# Patient Record
Sex: MALE | Race: WHITE | NOT HISPANIC OR LATINO | Employment: OTHER | URBAN - METROPOLITAN AREA
[De-identification: names, ages, dates, MRNs, and addresses within clinical notes are randomized per-mention and may not be internally consistent; named-entity substitution may affect disease eponyms.]

---

## 2018-04-26 ENCOUNTER — APPOINTMENT (OUTPATIENT)
Dept: PREADMISSION TESTING | Facility: HOSPITAL | Age: 73
End: 2018-04-26

## 2018-04-26 VITALS — HEIGHT: 68 IN | WEIGHT: 177.25 LBS | BODY MASS INDEX: 26.86 KG/M2

## 2018-04-26 LAB
DEPRECATED RDW RBC AUTO: 46.1 FL (ref 37–54)
ERYTHROCYTE [DISTWIDTH] IN BLOOD BY AUTOMATED COUNT: 13.2 % (ref 11.3–14.5)
HCT VFR BLD AUTO: 48 % (ref 38.9–50.9)
HGB BLD-MCNC: 16.2 G/DL (ref 13.1–17.5)
MCH RBC QN AUTO: 32.6 PG (ref 27–31)
MCHC RBC AUTO-ENTMCNC: 33.8 G/DL (ref 32–36)
MCV RBC AUTO: 96.6 FL (ref 80–99)
PLATELET # BLD AUTO: 421 10*3/MM3 (ref 150–450)
PMV BLD AUTO: 9.2 FL (ref 6–12)
POTASSIUM BLD-SCNC: 5.2 MMOL/L (ref 3.5–5.5)
RBC # BLD AUTO: 4.97 10*6/MM3 (ref 4.2–5.76)
WBC NRBC COR # BLD: 19.64 10*3/MM3 (ref 3.5–10.8)

## 2018-04-26 PROCEDURE — 93005 ELECTROCARDIOGRAM TRACING: CPT

## 2018-04-26 PROCEDURE — 36415 COLL VENOUS BLD VENIPUNCTURE: CPT

## 2018-04-26 PROCEDURE — 85027 COMPLETE CBC AUTOMATED: CPT | Performed by: ORTHOPAEDIC SURGERY

## 2018-04-26 PROCEDURE — 93010 ELECTROCARDIOGRAM REPORT: CPT | Performed by: INTERNAL MEDICINE

## 2018-04-26 PROCEDURE — 84132 ASSAY OF SERUM POTASSIUM: CPT | Performed by: ORTHOPAEDIC SURGERY

## 2018-04-26 RX ORDER — OMEPRAZOLE 20 MG/1
20 CAPSULE, DELAYED RELEASE ORAL DAILY
COMMUNITY

## 2018-04-26 RX ORDER — HYDROCODONE BITARTRATE AND ACETAMINOPHEN 7.5; 325 MG/1; MG/1
1 TABLET ORAL EVERY 6 HOURS PRN
COMMUNITY
End: 2020-11-03

## 2018-04-26 RX ORDER — LISINOPRIL 20 MG/1
20 TABLET ORAL DAILY
COMMUNITY

## 2018-04-26 NOTE — DISCHARGE INSTRUCTIONS

## 2018-04-26 NOTE — PAT
Patient to apply Chlorhexadine wipes  to surgical area (as instructed) the night before procedure and the AM of procedure. Wipes provided.    Patient instructed to drink 20 ounces (or until full) of Gatorade or 20 ounces of G2 (if diabetic) and complete 3 hours before your surgery start time. (NO RED Gatorade or G2)    Patient verbalized understanding.    EKG done and on chart.  Cardiac clearance per Dr. Cano.

## 2018-04-27 ENCOUNTER — ANESTHESIA (OUTPATIENT)
Dept: PERIOP | Facility: HOSPITAL | Age: 73
End: 2018-04-27

## 2018-04-27 ENCOUNTER — ANESTHESIA EVENT (OUTPATIENT)
Dept: PERIOP | Facility: HOSPITAL | Age: 73
End: 2018-04-27

## 2018-04-27 ENCOUNTER — HOSPITAL ENCOUNTER (OUTPATIENT)
Facility: HOSPITAL | Age: 73
Setting detail: HOSPITAL OUTPATIENT SURGERY
Discharge: HOME | End: 2018-04-27
Attending: ORTHOPAEDIC SURGERY | Admitting: ORTHOPAEDIC SURGERY

## 2018-04-27 ENCOUNTER — APPOINTMENT (OUTPATIENT)
Dept: GENERAL RADIOLOGY | Facility: HOSPITAL | Age: 73
End: 2018-04-27

## 2018-04-27 VITALS
SYSTOLIC BLOOD PRESSURE: 158 MMHG | OXYGEN SATURATION: 94 % | HEIGHT: 68 IN | RESPIRATION RATE: 18 BRPM | HEART RATE: 91 BPM | TEMPERATURE: 98.7 F | DIASTOLIC BLOOD PRESSURE: 75 MMHG | WEIGHT: 177 LBS | BODY MASS INDEX: 26.83 KG/M2

## 2018-04-27 PROCEDURE — 25010000002 FENTANYL CITRATE (PF) 100 MCG/2ML SOLUTION: Performed by: NURSE ANESTHETIST, CERTIFIED REGISTERED

## 2018-04-27 PROCEDURE — 25010000002 ONDANSETRON PER 1 MG: Performed by: NURSE ANESTHETIST, CERTIFIED REGISTERED

## 2018-04-27 PROCEDURE — 25010000002 DEXAMETHASONE PER 1 MG: Performed by: NURSE ANESTHETIST, CERTIFIED REGISTERED

## 2018-04-27 PROCEDURE — 72020 X-RAY EXAM OF SPINE 1 VIEW: CPT

## 2018-04-27 PROCEDURE — 25010000002 NEOSTIGMINE 10 MG/10ML SOLUTION: Performed by: NURSE ANESTHETIST, CERTIFIED REGISTERED

## 2018-04-27 PROCEDURE — 25010000002 PHENYLEPHRINE PER 1 ML: Performed by: NURSE ANESTHETIST, CERTIFIED REGISTERED

## 2018-04-27 PROCEDURE — 25010000002 VANCOMYCIN: Performed by: ORTHOPAEDIC SURGERY

## 2018-04-27 PROCEDURE — 25010000002 PROPOFOL 10 MG/ML EMULSION: Performed by: NURSE ANESTHETIST, CERTIFIED REGISTERED

## 2018-04-27 RX ORDER — LIDOCAINE HYDROCHLORIDE 10 MG/ML
INJECTION, SOLUTION EPIDURAL; INFILTRATION; INTRACAUDAL; PERINEURAL AS NEEDED
Status: DISCONTINUED | OUTPATIENT
Start: 2018-04-27 | End: 2018-04-27 | Stop reason: SURG

## 2018-04-27 RX ORDER — LIDOCAINE HYDROCHLORIDE 10 MG/ML
0.5 INJECTION, SOLUTION EPIDURAL; INFILTRATION; INTRACAUDAL; PERINEURAL ONCE AS NEEDED
Status: COMPLETED | OUTPATIENT
Start: 2018-04-27 | End: 2018-04-27

## 2018-04-27 RX ORDER — ONDANSETRON 2 MG/ML
INJECTION INTRAMUSCULAR; INTRAVENOUS AS NEEDED
Status: DISCONTINUED | OUTPATIENT
Start: 2018-04-27 | End: 2018-04-27 | Stop reason: SURG

## 2018-04-27 RX ORDER — NEOSTIGMINE METHYLSULFATE 1 MG/ML
INJECTION, SOLUTION INTRAVENOUS AS NEEDED
Status: DISCONTINUED | OUTPATIENT
Start: 2018-04-27 | End: 2018-04-27 | Stop reason: SURG

## 2018-04-27 RX ORDER — PREDNISONE 10 MG/1
10 TABLET ORAL DAILY
COMMUNITY
End: 2018-04-27 | Stop reason: HOSPADM

## 2018-04-27 RX ORDER — FENTANYL CITRATE 50 UG/ML
INJECTION, SOLUTION INTRAMUSCULAR; INTRAVENOUS AS NEEDED
Status: DISCONTINUED | OUTPATIENT
Start: 2018-04-27 | End: 2018-04-27 | Stop reason: SURG

## 2018-04-27 RX ORDER — PREGABALIN 75 MG/1
75 CAPSULE ORAL ONCE
Status: COMPLETED | OUTPATIENT
Start: 2018-04-27 | End: 2018-04-27

## 2018-04-27 RX ORDER — ACETAMINOPHEN 500 MG
1000 TABLET ORAL ONCE
Status: COMPLETED | OUTPATIENT
Start: 2018-04-27 | End: 2018-04-27

## 2018-04-27 RX ORDER — PROPOFOL 10 MG/ML
VIAL (ML) INTRAVENOUS CONTINUOUS PRN
Status: DISCONTINUED | OUTPATIENT
Start: 2018-04-27 | End: 2018-04-27 | Stop reason: SURG

## 2018-04-27 RX ORDER — OXYCODONE HCL 10 MG/1
10 TABLET, FILM COATED, EXTENDED RELEASE ORAL ONCE
Status: COMPLETED | OUTPATIENT
Start: 2018-04-27 | End: 2018-04-27

## 2018-04-27 RX ORDER — FAMOTIDINE 20 MG/1
20 TABLET, FILM COATED ORAL ONCE
Status: COMPLETED | OUTPATIENT
Start: 2018-04-27 | End: 2018-04-27

## 2018-04-27 RX ORDER — ONDANSETRON 4 MG/1
4 TABLET, FILM COATED ORAL ONCE AS NEEDED
Status: DISCONTINUED | OUTPATIENT
Start: 2018-04-27 | End: 2018-04-27 | Stop reason: HOSPADM

## 2018-04-27 RX ORDER — MAGNESIUM HYDROXIDE 1200 MG/15ML
LIQUID ORAL AS NEEDED
Status: DISCONTINUED | OUTPATIENT
Start: 2018-04-27 | End: 2018-04-27 | Stop reason: HOSPADM

## 2018-04-27 RX ORDER — EPHEDRINE SULFATE 50 MG/ML
5 INJECTION, SOLUTION INTRAVENOUS ONCE AS NEEDED
Status: DISCONTINUED | OUTPATIENT
Start: 2018-04-27 | End: 2018-04-27 | Stop reason: HOSPADM

## 2018-04-27 RX ORDER — DEXAMETHASONE SODIUM PHOSPHATE 4 MG/ML
INJECTION, SOLUTION INTRA-ARTICULAR; INTRALESIONAL; INTRAMUSCULAR; INTRAVENOUS; SOFT TISSUE AS NEEDED
Status: DISCONTINUED | OUTPATIENT
Start: 2018-04-27 | End: 2018-04-27 | Stop reason: SURG

## 2018-04-27 RX ORDER — BUPIVACAINE HYDROCHLORIDE AND EPINEPHRINE 2.5; 5 MG/ML; UG/ML
INJECTION, SOLUTION EPIDURAL; INFILTRATION; INTRACAUDAL; PERINEURAL AS NEEDED
Status: DISCONTINUED | OUTPATIENT
Start: 2018-04-27 | End: 2018-04-27 | Stop reason: HOSPADM

## 2018-04-27 RX ORDER — FENTANYL CITRATE 50 UG/ML
25 INJECTION, SOLUTION INTRAMUSCULAR; INTRAVENOUS
Status: DISCONTINUED | OUTPATIENT
Start: 2018-04-27 | End: 2018-04-27 | Stop reason: HOSPADM

## 2018-04-27 RX ORDER — CELECOXIB 200 MG/1
200 CAPSULE ORAL ONCE
Status: COMPLETED | OUTPATIENT
Start: 2018-04-27 | End: 2018-04-27

## 2018-04-27 RX ORDER — GLYCOPYRROLATE 0.2 MG/ML
INJECTION INTRAMUSCULAR; INTRAVENOUS AS NEEDED
Status: DISCONTINUED | OUTPATIENT
Start: 2018-04-27 | End: 2018-04-27 | Stop reason: SURG

## 2018-04-27 RX ORDER — SODIUM CHLORIDE 0.9 % (FLUSH) 0.9 %
1-10 SYRINGE (ML) INJECTION AS NEEDED
Status: CANCELLED | OUTPATIENT
Start: 2018-04-27

## 2018-04-27 RX ORDER — ONDANSETRON 2 MG/ML
4 INJECTION INTRAMUSCULAR; INTRAVENOUS ONCE AS NEEDED
Status: DISCONTINUED | OUTPATIENT
Start: 2018-04-27 | End: 2018-04-27 | Stop reason: HOSPADM

## 2018-04-27 RX ORDER — HYDROCODONE BITARTRATE AND ACETAMINOPHEN 7.5; 325 MG/1; MG/1
1 TABLET ORAL ONCE AS NEEDED
Status: DISCONTINUED | OUTPATIENT
Start: 2018-04-27 | End: 2018-04-27 | Stop reason: HOSPADM

## 2018-04-27 RX ORDER — SODIUM CHLORIDE, SODIUM LACTATE, POTASSIUM CHLORIDE, CALCIUM CHLORIDE 600; 310; 30; 20 MG/100ML; MG/100ML; MG/100ML; MG/100ML
9 INJECTION, SOLUTION INTRAVENOUS CONTINUOUS
Status: DISCONTINUED | OUTPATIENT
Start: 2018-04-27 | End: 2018-04-27 | Stop reason: HOSPADM

## 2018-04-27 RX ORDER — ATRACURIUM BESYLATE 10 MG/ML
INJECTION, SOLUTION INTRAVENOUS AS NEEDED
Status: DISCONTINUED | OUTPATIENT
Start: 2018-04-27 | End: 2018-04-27 | Stop reason: SURG

## 2018-04-27 RX ORDER — PROPOFOL 10 MG/ML
VIAL (ML) INTRAVENOUS AS NEEDED
Status: DISCONTINUED | OUTPATIENT
Start: 2018-04-27 | End: 2018-04-27 | Stop reason: SURG

## 2018-04-27 RX ADMIN — LIDOCAINE HYDROCHLORIDE 50 MG: 10 INJECTION, SOLUTION EPIDURAL; INFILTRATION; INTRACAUDAL; PERINEURAL at 12:36

## 2018-04-27 RX ADMIN — PHENYLEPHRINE HYDROCHLORIDE 80 MCG: 10 INJECTION INTRAVENOUS at 13:35

## 2018-04-27 RX ADMIN — FENTANYL CITRATE 25 MCG: 50 INJECTION INTRAMUSCULAR; INTRAVENOUS at 14:25

## 2018-04-27 RX ADMIN — ATRACURIUM BESYLATE 50 MG: 10 INJECTION, SOLUTION INTRAVENOUS at 12:36

## 2018-04-27 RX ADMIN — FENTANYL CITRATE 25 MCG: 50 INJECTION INTRAMUSCULAR; INTRAVENOUS at 15:07

## 2018-04-27 RX ADMIN — DEXAMETHASONE SODIUM PHOSPHATE 8 MG: 4 INJECTION, SOLUTION INTRAMUSCULAR; INTRAVENOUS at 12:52

## 2018-04-27 RX ADMIN — ONDANSETRON 4 MG: 2 INJECTION INTRAMUSCULAR; INTRAVENOUS at 13:39

## 2018-04-27 RX ADMIN — SODIUM CHLORIDE, POTASSIUM CHLORIDE, SODIUM LACTATE AND CALCIUM CHLORIDE 9 ML/HR: 600; 310; 30; 20 INJECTION, SOLUTION INTRAVENOUS at 08:57

## 2018-04-27 RX ADMIN — CELECOXIB 200 MG: 200 CAPSULE ORAL at 09:28

## 2018-04-27 RX ADMIN — VANCOMYCIN HYDROCHLORIDE 1250 MG: 1 INJECTION, POWDER, LYOPHILIZED, FOR SOLUTION INTRAVENOUS at 11:31

## 2018-04-27 RX ADMIN — FENTANYL CITRATE 100 MCG: 50 INJECTION, SOLUTION INTRAMUSCULAR; INTRAVENOUS at 12:36

## 2018-04-27 RX ADMIN — LIDOCAINE HYDROCHLORIDE 0.2 ML: 10 INJECTION, SOLUTION EPIDURAL; INFILTRATION; INTRACAUDAL; PERINEURAL at 08:57

## 2018-04-27 RX ADMIN — MUPIROCIN: 20 OINTMENT TOPICAL at 08:55

## 2018-04-27 RX ADMIN — PROPOFOL 150 MG: 10 INJECTION, EMULSION INTRAVENOUS at 12:36

## 2018-04-27 RX ADMIN — GLYCOPYRROLATE 0.4 MG: 0.2 INJECTION, SOLUTION INTRAMUSCULAR; INTRAVENOUS at 13:40

## 2018-04-27 RX ADMIN — PHENYLEPHRINE HYDROCHLORIDE 80 MCG: 10 INJECTION INTRAVENOUS at 13:37

## 2018-04-27 RX ADMIN — NEOSTIGMINE METHYLSULFATE 3 MG: 1 INJECTION, SOLUTION INTRAVENOUS at 13:40

## 2018-04-27 RX ADMIN — FAMOTIDINE 20 MG: 20 TABLET ORAL at 08:57

## 2018-04-27 RX ADMIN — PROPOFOL 25 MCG/KG/MIN: 10 INJECTION, EMULSION INTRAVENOUS at 12:48

## 2018-04-27 RX ADMIN — ACETAMINOPHEN 1000 MG: 500 TABLET, FILM COATED ORAL at 08:57

## 2018-04-27 RX ADMIN — OXYCODONE HYDROCHLORIDE 10 MG: 10 TABLET, FILM COATED, EXTENDED RELEASE ORAL at 08:57

## 2018-04-27 RX ADMIN — PREGABALIN 75 MG: 75 CAPSULE ORAL at 08:57

## 2018-04-27 NOTE — ANESTHESIA PREPROCEDURE EVALUATION
Anesthesia Evaluation     NPO Solid Status: > 8 hours  NPO Liquid Status: > 6 hours           Airway   Mallampati: II  TM distance: >3 FB  Neck ROM: full  No difficulty expected  Dental    (+) upper dentures    Pulmonary     breath sounds clear to auscultation  (-) asthma, not a smoker  Cardiovascular     ECG reviewed  Rhythm: regular  Rate: normal    (+) hypertension,   (-) pacemaker, angina, murmur, cardiac stents      Neuro/Psych  (-) seizures, TIA, CVA  GI/Hepatic/Renal/Endo    (-) no renal disease, diabetes, hypothyroidism    Musculoskeletal     Abdominal    Substance History      OB/GYN          Other        ROS/Med Hx Other: EKG's reviewed, Denies CP, SOB or cardiac symptoms                Anesthesia Plan    ASA 2     general     intravenous induction     Plan discussed with CRNA.

## 2018-04-27 NOTE — ANESTHESIA POSTPROCEDURE EVALUATION
Patient: Adiel Lopez    Procedure Summary     Date:  04/27/18 Room / Location:   [facility] [address] [place] [address] [facility]    Anesthesia Start:  1232 Anesthesia Stop:      Procedure:  LUMBAR DISCECTOMY REVISION L4-5 (N/A Spine Lumbar) Diagnosis:      Surgeon:  Wojciech Kathleen MD Provider:  Ed Flores MD    Anesthesia Type:  general ASA Status:  2          Anesthesia Type: general  Last vitals  BP   130/72   Temp   98.7   Pulse   99   Resp   12     SpO2   99%     Post Anesthesia Care and Evaluation    Patient location during evaluation: PACU  Patient participation: complete - patient participated  Level of consciousness: awake  Pain score: 0  Pain management: adequate  Airway patency: patent  Anesthetic complications: No anesthetic complications  PONV Status: none  Cardiovascular status: acceptable and stable  Respiratory status: nasal cannula, unassisted, acceptable and spontaneous ventilation  Hydration status: acceptable

## 2018-04-27 NOTE — ANESTHESIA PROCEDURE NOTES
Airway  Urgency: elective    Airway not difficult    General Information and Staff    Patient location during procedure: OR    Indications and Patient Condition  Indications for airway management: airway protection    Preoxygenated: yes  MILS not maintained throughout  Mask difficulty assessment: 1 - vent by mask    Final Airway Details  Final airway type: endotracheal airway      Successful airway: ETT  Cuffed: yes   Successful intubation technique: direct laryngoscopy  Endotracheal tube insertion site: oral  Blade: Matt  Blade size: #2  ETT size: 7.5 mm  Cormack-Lehane Classification: grade I - full view of glottis  Placement verified by: chest auscultation and capnometry   Cuff volume (mL): 10  Measured from: lips  ETT to lips (cm): 23  Number of attempts at approach: 1    Additional Comments  Negative epigastric sounds, Breath sound equal bilaterally with symmetric chest rise and fall

## 2020-11-03 ENCOUNTER — APPOINTMENT (OUTPATIENT)
Dept: PREADMISSION TESTING | Facility: HOSPITAL | Age: 75
End: 2020-11-03

## 2020-11-03 VITALS — BODY MASS INDEX: 27.13 KG/M2 | WEIGHT: 179.01 LBS | HEIGHT: 68 IN

## 2020-11-03 LAB
DEPRECATED RDW RBC AUTO: 47 FL (ref 37–54)
ERYTHROCYTE [DISTWIDTH] IN BLOOD BY AUTOMATED COUNT: 13.2 % (ref 12.3–15.4)
HBA1C MFR BLD: 5.7 % (ref 4.8–5.6)
HCT VFR BLD AUTO: 44.8 % (ref 37.5–51)
HGB BLD-MCNC: 14.8 G/DL (ref 13–17.7)
MCH RBC QN AUTO: 31.9 PG (ref 26.6–33)
MCHC RBC AUTO-ENTMCNC: 33 G/DL (ref 31.5–35.7)
MCV RBC AUTO: 96.6 FL (ref 79–97)
PLATELET # BLD AUTO: 358 10*3/MM3 (ref 140–450)
PMV BLD AUTO: 9 FL (ref 6–12)
POTASSIUM SERPL-SCNC: 4.5 MMOL/L (ref 3.5–5.2)
RBC # BLD AUTO: 4.64 10*6/MM3 (ref 4.14–5.8)
WBC # BLD AUTO: 11.28 10*3/MM3 (ref 3.4–10.8)

## 2020-11-03 PROCEDURE — C9803 HOPD COVID-19 SPEC COLLECT: HCPCS

## 2020-11-03 PROCEDURE — 36415 COLL VENOUS BLD VENIPUNCTURE: CPT

## 2020-11-03 PROCEDURE — 84132 ASSAY OF SERUM POTASSIUM: CPT | Performed by: ORTHOPAEDIC SURGERY

## 2020-11-03 PROCEDURE — 85027 COMPLETE CBC AUTOMATED: CPT | Performed by: ORTHOPAEDIC SURGERY

## 2020-11-03 PROCEDURE — U0004 COV-19 TEST NON-CDC HGH THRU: HCPCS

## 2020-11-03 PROCEDURE — 83036 HEMOGLOBIN GLYCOSYLATED A1C: CPT | Performed by: ORTHOPAEDIC SURGERY

## 2020-11-03 RX ORDER — TRAZODONE HYDROCHLORIDE 50 MG/1
50 TABLET ORAL NIGHTLY
COMMUNITY

## 2020-11-03 RX ORDER — AMLODIPINE BESYLATE 5 MG/1
5 TABLET ORAL DAILY
COMMUNITY

## 2020-11-03 NOTE — PAT
Patient to apply Chlorhexadine wipes  to surgical area (as instructed) the night before procedure and the AM of procedure. Wipes provided.    Per Anesthesia Request, patient instructed not to take their ACE/ARB medications on the AM of surgery.    Patient instructed to drink 20 ounces (or until full) of Gatorade and it needs to be completed 1 hour before given arrival time for procedure (NO RED Gatorade)    Patient verbalized understanding.    EKG placed on chart from 10/1/20

## 2020-11-04 ENCOUNTER — ANESTHESIA EVENT (OUTPATIENT)
Dept: PERIOP | Facility: HOSPITAL | Age: 75
End: 2020-11-04

## 2020-11-04 LAB — SARS-COV-2 RNA RESP QL NAA+PROBE: NOT DETECTED

## 2020-11-04 RX ORDER — SODIUM CHLORIDE 0.9 % (FLUSH) 0.9 %
10 SYRINGE (ML) INJECTION AS NEEDED
Status: CANCELLED | OUTPATIENT
Start: 2020-11-04

## 2020-11-04 RX ORDER — SODIUM CHLORIDE 0.9 % (FLUSH) 0.9 %
10 SYRINGE (ML) INJECTION EVERY 12 HOURS SCHEDULED
Status: CANCELLED | OUTPATIENT
Start: 2020-11-04

## 2020-11-04 RX ORDER — FAMOTIDINE 10 MG/ML
20 INJECTION, SOLUTION INTRAVENOUS ONCE
Status: CANCELLED | OUTPATIENT
Start: 2020-11-04 | End: 2020-11-04

## 2020-11-05 ENCOUNTER — HOSPITAL ENCOUNTER (OUTPATIENT)
Facility: HOSPITAL | Age: 75
LOS: 1 days | Discharge: HOME | End: 2020-11-07
Attending: ORTHOPAEDIC SURGERY | Admitting: ORTHOPAEDIC SURGERY

## 2020-11-05 ENCOUNTER — APPOINTMENT (OUTPATIENT)
Dept: GENERAL RADIOLOGY | Facility: HOSPITAL | Age: 75
End: 2020-11-05

## 2020-11-05 ENCOUNTER — ANESTHESIA (OUTPATIENT)
Dept: PERIOP | Facility: HOSPITAL | Age: 75
End: 2020-11-05

## 2020-11-05 DIAGNOSIS — M48.061 SPINAL STENOSIS OF LUMBAR REGION, UNSPECIFIED WHETHER NEUROGENIC CLAUDICATION PRESENT: Primary | ICD-10-CM

## 2020-11-05 PROBLEM — I10 ESSENTIAL HYPERTENSION: Status: ACTIVE | Noted: 2020-11-05

## 2020-11-05 PROBLEM — K21.9 GERD WITHOUT ESOPHAGITIS: Status: ACTIVE | Noted: 2020-11-05

## 2020-11-05 PROBLEM — M54.9 BACK PAIN: Status: ACTIVE | Noted: 2020-11-05

## 2020-11-05 PROCEDURE — A9270 NON-COVERED ITEM OR SERVICE: HCPCS | Performed by: INTERNAL MEDICINE

## 2020-11-05 PROCEDURE — 25010000002 HYDROMORPHONE PER 4 MG: Performed by: NURSE ANESTHETIST, CERTIFIED REGISTERED

## 2020-11-05 PROCEDURE — 63710000001 ACETAMINOPHEN 325 MG TABLET: Performed by: ORTHOPAEDIC SURGERY

## 2020-11-05 PROCEDURE — C1713 ANCHOR/SCREW BN/BN,TIS/BN: HCPCS | Performed by: ORTHOPAEDIC SURGERY

## 2020-11-05 PROCEDURE — 25010000002 ONDANSETRON PER 1 MG: Performed by: ORTHOPAEDIC SURGERY

## 2020-11-05 PROCEDURE — 63710000001 TRAZODONE 50 MG TABLET: Performed by: INTERNAL MEDICINE

## 2020-11-05 PROCEDURE — 63710000001 FAMOTIDINE 20 MG TABLET: Performed by: ORTHOPAEDIC SURGERY

## 2020-11-05 PROCEDURE — 25010000002 PHENYLEPHRINE PER 1 ML: Performed by: NURSE ANESTHETIST, CERTIFIED REGISTERED

## 2020-11-05 PROCEDURE — 25010000002 MORPHINE PER 10 MG: Performed by: ORTHOPAEDIC SURGERY

## 2020-11-05 PROCEDURE — 25010000002 FENTANYL CITRATE (PF) 100 MCG/2ML SOLUTION: Performed by: NURSE ANESTHETIST, CERTIFIED REGISTERED

## 2020-11-05 PROCEDURE — 25010000002 VANCOMYCIN 10 G RECONSTITUTED SOLUTION: Performed by: ORTHOPAEDIC SURGERY

## 2020-11-05 PROCEDURE — 25010000002 DEXAMETHASONE PER 1 MG: Performed by: NURSE ANESTHETIST, CERTIFIED REGISTERED

## 2020-11-05 PROCEDURE — 25010000002 PROPOFOL 10 MG/ML EMULSION: Performed by: NURSE ANESTHETIST, CERTIFIED REGISTERED

## 2020-11-05 PROCEDURE — 94799 UNLISTED PULMONARY SVC/PX: CPT

## 2020-11-05 PROCEDURE — A9270 NON-COVERED ITEM OR SERVICE: HCPCS | Performed by: ORTHOPAEDIC SURGERY

## 2020-11-05 PROCEDURE — 25010000002 NEOSTIGMINE 10 MG/10ML SOLUTION: Performed by: NURSE ANESTHETIST, CERTIFIED REGISTERED

## 2020-11-05 PROCEDURE — 22633 ARTHRD CMBN 1NTRSPC LUMBAR: CPT | Performed by: PHYSICIAN ASSISTANT

## 2020-11-05 PROCEDURE — 63710000001 OXYCODONE-ACETAMINOPHEN 10-325 MG TABLET: Performed by: ORTHOPAEDIC SURGERY

## 2020-11-05 PROCEDURE — 76000 FLUOROSCOPY <1 HR PHYS/QHP: CPT

## 2020-11-05 PROCEDURE — 25810000003 SODIUM CHLORIDE 0.9 % WITH KCL 20 MEQ 20-0.9 MEQ/L-% SOLUTION: Performed by: ORTHOPAEDIC SURGERY

## 2020-11-05 PROCEDURE — 25010000002 ONDANSETRON PER 1 MG: Performed by: NURSE ANESTHETIST, CERTIFIED REGISTERED

## 2020-11-05 DEVICE — ROD PREBNT SPINE EXPEDIUM TI 5.5X40MM: Type: IMPLANTABLE DEVICE | Site: SPINE LUMBAR | Status: FUNCTIONAL

## 2020-11-05 DEVICE — SPACR OPAL 10X12X28MM: Type: IMPLANTABLE DEVICE | Site: SPINE LUMBAR | Status: FUNCTIONAL

## 2020-11-05 DEVICE — ALLOGRFT BONE VIVIGEN CELLUAR MATRX FORMABLE 5CC: Type: IMPLANTABLE DEVICE | Site: SPINE LUMBAR | Status: FUNCTIONAL

## 2020-11-05 DEVICE — SCRW VIPER INNR ST: Type: IMPLANTABLE DEVICE | Site: SPINE LUMBAR | Status: FUNCTIONAL

## 2020-11-05 DEVICE — SCRW CORT VIPER PLS5.5 TI FIX 6X40MM: Type: IMPLANTABLE DEVICE | Site: SPINE LUMBAR | Status: FUNCTIONAL

## 2020-11-05 RX ORDER — SODIUM CHLORIDE 0.9 % (FLUSH) 0.9 %
10 SYRINGE (ML) INJECTION AS NEEDED
Status: DISCONTINUED | OUTPATIENT
Start: 2020-11-05 | End: 2020-11-07 | Stop reason: HOSPADM

## 2020-11-05 RX ORDER — SODIUM CHLORIDE, SODIUM LACTATE, POTASSIUM CHLORIDE, CALCIUM CHLORIDE 600; 310; 30; 20 MG/100ML; MG/100ML; MG/100ML; MG/100ML
9 INJECTION, SOLUTION INTRAVENOUS CONTINUOUS
Status: DISCONTINUED | OUTPATIENT
Start: 2020-11-05 | End: 2020-11-07 | Stop reason: HOSPADM

## 2020-11-05 RX ORDER — HYDRALAZINE HYDROCHLORIDE 20 MG/ML
5 INJECTION INTRAMUSCULAR; INTRAVENOUS
Status: DISCONTINUED | OUTPATIENT
Start: 2020-11-05 | End: 2020-11-05 | Stop reason: HOSPADM

## 2020-11-05 RX ORDER — NALOXONE HCL 0.4 MG/ML
0.4 VIAL (ML) INJECTION
Status: DISCONTINUED | OUTPATIENT
Start: 2020-11-05 | End: 2020-11-07 | Stop reason: HOSPADM

## 2020-11-05 RX ORDER — OXYCODONE AND ACETAMINOPHEN 10; 325 MG/1; MG/1
1 TABLET ORAL EVERY 4 HOURS PRN
Status: DISCONTINUED | OUTPATIENT
Start: 2020-11-05 | End: 2020-11-07 | Stop reason: HOSPADM

## 2020-11-05 RX ORDER — SODIUM CHLORIDE 0.9 % (FLUSH) 0.9 %
3-10 SYRINGE (ML) INJECTION AS NEEDED
Status: DISCONTINUED | OUTPATIENT
Start: 2020-11-05 | End: 2020-11-05 | Stop reason: HOSPADM

## 2020-11-05 RX ORDER — SODIUM CHLORIDE AND POTASSIUM CHLORIDE 150; 900 MG/100ML; MG/100ML
100 INJECTION, SOLUTION INTRAVENOUS CONTINUOUS
Status: DISCONTINUED | OUTPATIENT
Start: 2020-11-05 | End: 2020-11-07 | Stop reason: HOSPADM

## 2020-11-05 RX ORDER — MEPERIDINE HYDROCHLORIDE 25 MG/ML
12.5 INJECTION INTRAMUSCULAR; INTRAVENOUS; SUBCUTANEOUS
Status: DISCONTINUED | OUTPATIENT
Start: 2020-11-05 | End: 2020-11-05 | Stop reason: HOSPADM

## 2020-11-05 RX ORDER — ONDANSETRON 2 MG/ML
4 INJECTION INTRAMUSCULAR; INTRAVENOUS EVERY 6 HOURS PRN
Status: DISCONTINUED | OUTPATIENT
Start: 2020-11-05 | End: 2020-11-07 | Stop reason: HOSPADM

## 2020-11-05 RX ORDER — LISINOPRIL 20 MG/1
20 TABLET ORAL DAILY
Status: DISCONTINUED | OUTPATIENT
Start: 2020-11-06 | End: 2020-11-07 | Stop reason: HOSPADM

## 2020-11-05 RX ORDER — NEOSTIGMINE METHYLSULFATE 1 MG/ML
INJECTION, SOLUTION INTRAVENOUS AS NEEDED
Status: DISCONTINUED | OUTPATIENT
Start: 2020-11-05 | End: 2020-11-05 | Stop reason: SURG

## 2020-11-05 RX ORDER — CYCLOBENZAPRINE HCL 10 MG
10 TABLET ORAL 3 TIMES DAILY PRN
Status: DISCONTINUED | OUTPATIENT
Start: 2020-11-05 | End: 2020-11-07 | Stop reason: HOSPADM

## 2020-11-05 RX ORDER — SODIUM CHLORIDE 0.9 % (FLUSH) 0.9 %
3 SYRINGE (ML) INJECTION EVERY 12 HOURS SCHEDULED
Status: DISCONTINUED | OUTPATIENT
Start: 2020-11-05 | End: 2020-11-07 | Stop reason: HOSPADM

## 2020-11-05 RX ORDER — IPRATROPIUM BROMIDE AND ALBUTEROL SULFATE 2.5; .5 MG/3ML; MG/3ML
3 SOLUTION RESPIRATORY (INHALATION) ONCE AS NEEDED
Status: DISCONTINUED | OUTPATIENT
Start: 2020-11-05 | End: 2020-11-05 | Stop reason: HOSPADM

## 2020-11-05 RX ORDER — FAMOTIDINE 20 MG/1
20 TABLET, FILM COATED ORAL EVERY 12 HOURS SCHEDULED
Status: DISCONTINUED | OUTPATIENT
Start: 2020-11-05 | End: 2020-11-07 | Stop reason: HOSPADM

## 2020-11-05 RX ORDER — PROPOFOL 10 MG/ML
VIAL (ML) INTRAVENOUS AS NEEDED
Status: DISCONTINUED | OUTPATIENT
Start: 2020-11-05 | End: 2020-11-05 | Stop reason: SURG

## 2020-11-05 RX ORDER — SODIUM CHLORIDE 0.9 % (FLUSH) 0.9 %
3 SYRINGE (ML) INJECTION EVERY 12 HOURS SCHEDULED
Status: DISCONTINUED | OUTPATIENT
Start: 2020-11-05 | End: 2020-11-05 | Stop reason: HOSPADM

## 2020-11-05 RX ORDER — ACETAMINOPHEN 500 MG
1000 TABLET ORAL ONCE
Status: COMPLETED | OUTPATIENT
Start: 2020-11-05 | End: 2020-11-05

## 2020-11-05 RX ORDER — MORPHINE SULFATE 2 MG/ML
1 INJECTION, SOLUTION INTRAMUSCULAR; INTRAVENOUS EVERY 4 HOURS PRN
Status: DISCONTINUED | OUTPATIENT
Start: 2020-11-05 | End: 2020-11-07 | Stop reason: HOSPADM

## 2020-11-05 RX ORDER — FAMOTIDINE 10 MG/ML
20 INJECTION, SOLUTION INTRAVENOUS EVERY 12 HOURS SCHEDULED
Status: DISCONTINUED | OUTPATIENT
Start: 2020-11-05 | End: 2020-11-07 | Stop reason: HOSPADM

## 2020-11-05 RX ORDER — AMLODIPINE BESYLATE 5 MG/1
5 TABLET ORAL DAILY
Status: DISCONTINUED | OUTPATIENT
Start: 2020-11-05 | End: 2020-11-05

## 2020-11-05 RX ORDER — DEXAMETHASONE SODIUM PHOSPHATE 4 MG/ML
INJECTION, SOLUTION INTRA-ARTICULAR; INTRALESIONAL; INTRAMUSCULAR; INTRAVENOUS; SOFT TISSUE AS NEEDED
Status: DISCONTINUED | OUTPATIENT
Start: 2020-11-05 | End: 2020-11-05 | Stop reason: SURG

## 2020-11-05 RX ORDER — ONDANSETRON 2 MG/ML
4 INJECTION INTRAMUSCULAR; INTRAVENOUS ONCE AS NEEDED
Status: DISCONTINUED | OUTPATIENT
Start: 2020-11-05 | End: 2020-11-05 | Stop reason: HOSPADM

## 2020-11-05 RX ORDER — PREGABALIN 75 MG/1
75 CAPSULE ORAL ONCE
Status: COMPLETED | OUTPATIENT
Start: 2020-11-05 | End: 2020-11-05

## 2020-11-05 RX ORDER — ONDANSETRON 2 MG/ML
INJECTION INTRAMUSCULAR; INTRAVENOUS AS NEEDED
Status: DISCONTINUED | OUTPATIENT
Start: 2020-11-05 | End: 2020-11-05 | Stop reason: SURG

## 2020-11-05 RX ORDER — BUPIVACAINE HYDROCHLORIDE AND EPINEPHRINE 2.5; 5 MG/ML; UG/ML
INJECTION, SOLUTION INFILTRATION; PERINEURAL AS NEEDED
Status: DISCONTINUED | OUTPATIENT
Start: 2020-11-05 | End: 2020-11-05 | Stop reason: HOSPADM

## 2020-11-05 RX ORDER — AMLODIPINE BESYLATE 5 MG/1
5 TABLET ORAL DAILY
Status: DISCONTINUED | OUTPATIENT
Start: 2020-11-06 | End: 2020-11-07 | Stop reason: HOSPADM

## 2020-11-05 RX ORDER — ACETAMINOPHEN 325 MG/1
650 TABLET ORAL EVERY 4 HOURS PRN
Status: DISCONTINUED | OUTPATIENT
Start: 2020-11-05 | End: 2020-11-07 | Stop reason: HOSPADM

## 2020-11-05 RX ORDER — TRAZODONE HYDROCHLORIDE 50 MG/1
50 TABLET ORAL NIGHTLY
Status: DISCONTINUED | OUTPATIENT
Start: 2020-11-05 | End: 2020-11-07 | Stop reason: HOSPADM

## 2020-11-05 RX ORDER — GLYCOPYRROLATE 0.2 MG/ML
INJECTION INTRAMUSCULAR; INTRAVENOUS AS NEEDED
Status: DISCONTINUED | OUTPATIENT
Start: 2020-11-05 | End: 2020-11-05 | Stop reason: SURG

## 2020-11-05 RX ORDER — LABETALOL HYDROCHLORIDE 5 MG/ML
5 INJECTION, SOLUTION INTRAVENOUS
Status: DISCONTINUED | OUTPATIENT
Start: 2020-11-05 | End: 2020-11-05 | Stop reason: HOSPADM

## 2020-11-05 RX ORDER — PANTOPRAZOLE SODIUM 40 MG/1
40 TABLET, DELAYED RELEASE ORAL
Status: DISCONTINUED | OUTPATIENT
Start: 2020-11-06 | End: 2020-11-07 | Stop reason: HOSPADM

## 2020-11-05 RX ORDER — OXYCODONE HCL 10 MG/1
10 TABLET, FILM COATED, EXTENDED RELEASE ORAL ONCE
Status: COMPLETED | OUTPATIENT
Start: 2020-11-05 | End: 2020-11-05

## 2020-11-05 RX ORDER — NALOXONE HCL 0.4 MG/ML
0.4 VIAL (ML) INJECTION AS NEEDED
Status: DISCONTINUED | OUTPATIENT
Start: 2020-11-05 | End: 2020-11-05 | Stop reason: HOSPADM

## 2020-11-05 RX ORDER — FENTANYL CITRATE 50 UG/ML
50 INJECTION, SOLUTION INTRAMUSCULAR; INTRAVENOUS
Status: DISCONTINUED | OUTPATIENT
Start: 2020-11-05 | End: 2020-11-05 | Stop reason: HOSPADM

## 2020-11-05 RX ORDER — HYDROMORPHONE HYDROCHLORIDE 1 MG/ML
0.5 INJECTION, SOLUTION INTRAMUSCULAR; INTRAVENOUS; SUBCUTANEOUS
Status: DISCONTINUED | OUTPATIENT
Start: 2020-11-05 | End: 2020-11-05 | Stop reason: HOSPADM

## 2020-11-05 RX ORDER — LIDOCAINE HYDROCHLORIDE 10 MG/ML
INJECTION, SOLUTION EPIDURAL; INFILTRATION; INTRACAUDAL; PERINEURAL AS NEEDED
Status: DISCONTINUED | OUTPATIENT
Start: 2020-11-05 | End: 2020-11-05 | Stop reason: SURG

## 2020-11-05 RX ORDER — LABETALOL HYDROCHLORIDE 5 MG/ML
10 INJECTION, SOLUTION INTRAVENOUS EVERY 4 HOURS PRN
Status: DISCONTINUED | OUTPATIENT
Start: 2020-11-05 | End: 2020-11-07 | Stop reason: HOSPADM

## 2020-11-05 RX ORDER — LIDOCAINE HYDROCHLORIDE 10 MG/ML
0.5 INJECTION, SOLUTION EPIDURAL; INFILTRATION; INTRACAUDAL; PERINEURAL ONCE AS NEEDED
Status: COMPLETED | OUTPATIENT
Start: 2020-11-05 | End: 2020-11-05

## 2020-11-05 RX ORDER — HYDROCODONE BITARTRATE AND ACETAMINOPHEN 5; 325 MG/1; MG/1
1 TABLET ORAL ONCE AS NEEDED
Status: DISCONTINUED | OUTPATIENT
Start: 2020-11-05 | End: 2020-11-05 | Stop reason: HOSPADM

## 2020-11-05 RX ORDER — FENTANYL CITRATE 50 UG/ML
INJECTION, SOLUTION INTRAMUSCULAR; INTRAVENOUS AS NEEDED
Status: DISCONTINUED | OUTPATIENT
Start: 2020-11-05 | End: 2020-11-05 | Stop reason: SURG

## 2020-11-05 RX ORDER — ROCURONIUM BROMIDE 10 MG/ML
INJECTION, SOLUTION INTRAVENOUS AS NEEDED
Status: DISCONTINUED | OUTPATIENT
Start: 2020-11-05 | End: 2020-11-05 | Stop reason: SURG

## 2020-11-05 RX ORDER — FAMOTIDINE 20 MG/1
20 TABLET, FILM COATED ORAL ONCE
Status: COMPLETED | OUTPATIENT
Start: 2020-11-05 | End: 2020-11-05

## 2020-11-05 RX ADMIN — SODIUM CHLORIDE, POTASSIUM CHLORIDE, SODIUM LACTATE AND CALCIUM CHLORIDE: 600; 310; 30; 20 INJECTION, SOLUTION INTRAVENOUS at 09:29

## 2020-11-05 RX ADMIN — FENTANYL CITRATE 100 MCG: 50 INJECTION, SOLUTION INTRAMUSCULAR; INTRAVENOUS at 07:41

## 2020-11-05 RX ADMIN — PROPOFOL 150 MG: 10 INJECTION, EMULSION INTRAVENOUS at 07:41

## 2020-11-05 RX ADMIN — LIDOCAINE HYDROCHLORIDE 0.5 ML: 10 INJECTION, SOLUTION EPIDURAL; INFILTRATION; INTRACAUDAL; PERINEURAL at 06:05

## 2020-11-05 RX ADMIN — HYDROMORPHONE HYDROCHLORIDE 0.5 MG: 1 INJECTION, SOLUTION INTRAMUSCULAR; INTRAVENOUS; SUBCUTANEOUS at 11:15

## 2020-11-05 RX ADMIN — SODIUM CHLORIDE, PRESERVATIVE FREE 3 ML: 5 INJECTION INTRAVENOUS at 21:14

## 2020-11-05 RX ADMIN — ACETAMINOPHEN 1000 MG: 500 TABLET ORAL at 06:42

## 2020-11-05 RX ADMIN — LIDOCAINE HYDROCHLORIDE 50 MG: 10 INJECTION, SOLUTION EPIDURAL; INFILTRATION; INTRACAUDAL; PERINEURAL at 07:41

## 2020-11-05 RX ADMIN — VANCOMYCIN HYDROCHLORIDE 1250 MG: 100 INJECTION, POWDER, LYOPHILIZED, FOR SOLUTION INTRAVENOUS at 06:45

## 2020-11-05 RX ADMIN — FAMOTIDINE 20 MG: 20 TABLET ORAL at 21:14

## 2020-11-05 RX ADMIN — SODIUM CHLORIDE, POTASSIUM CHLORIDE, SODIUM LACTATE AND CALCIUM CHLORIDE 9 ML/HR: 600; 310; 30; 20 INJECTION, SOLUTION INTRAVENOUS at 06:05

## 2020-11-05 RX ADMIN — ROCURONIUM BROMIDE 10 MG: 10 INJECTION INTRAVENOUS at 09:29

## 2020-11-05 RX ADMIN — FAMOTIDINE 20 MG: 20 TABLET, FILM COATED ORAL at 06:42

## 2020-11-05 RX ADMIN — ACETAMINOPHEN 650 MG: 325 TABLET, FILM COATED ORAL at 22:27

## 2020-11-05 RX ADMIN — DEXAMETHASONE SODIUM PHOSPHATE 8 MG: 4 INJECTION, SOLUTION INTRAMUSCULAR; INTRAVENOUS at 07:48

## 2020-11-05 RX ADMIN — OXYCODONE HYDROCHLORIDE 10 MG: 10 TABLET, FILM COATED, EXTENDED RELEASE ORAL at 06:42

## 2020-11-05 RX ADMIN — ROCURONIUM BROMIDE 50 MG: 10 INJECTION INTRAVENOUS at 07:41

## 2020-11-05 RX ADMIN — ONDANSETRON 4 MG: 2 INJECTION INTRAMUSCULAR; INTRAVENOUS at 10:10

## 2020-11-05 RX ADMIN — NEOSTIGMINE 4 MG: 1 INJECTION INTRAVENOUS at 10:24

## 2020-11-05 RX ADMIN — OXYCODONE HYDROCHLORIDE AND ACETAMINOPHEN 1 TABLET: 10; 325 TABLET ORAL at 19:43

## 2020-11-05 RX ADMIN — MORPHINE SULFATE 1 MG: 2 INJECTION, SOLUTION INTRAMUSCULAR; INTRAVENOUS at 12:10

## 2020-11-05 RX ADMIN — GLYCOPYRROLATE 0.6 MG: 0.2 INJECTION INTRAMUSCULAR; INTRAVENOUS at 10:24

## 2020-11-05 RX ADMIN — POTASSIUM CHLORIDE AND SODIUM CHLORIDE 100 ML/HR: 900; 150 INJECTION, SOLUTION INTRAVENOUS at 12:11

## 2020-11-05 RX ADMIN — SODIUM CHLORIDE, POTASSIUM CHLORIDE, SODIUM LACTATE AND CALCIUM CHLORIDE: 600; 310; 30; 20 INJECTION, SOLUTION INTRAVENOUS at 07:37

## 2020-11-05 RX ADMIN — TRAZODONE HYDROCHLORIDE 50 MG: 50 TABLET ORAL at 21:14

## 2020-11-05 RX ADMIN — ONDANSETRON 4 MG: 2 INJECTION INTRAMUSCULAR; INTRAVENOUS at 14:54

## 2020-11-05 RX ADMIN — PROPOFOL 25 MCG/KG/MIN: 10 INJECTION, EMULSION INTRAVENOUS at 07:50

## 2020-11-05 RX ADMIN — PREGABALIN 75 MG: 75 CAPSULE ORAL at 06:42

## 2020-11-05 RX ADMIN — MUPIROCIN: 20 OINTMENT TOPICAL at 06:42

## 2020-11-05 RX ADMIN — ROCURONIUM BROMIDE 10 MG: 10 INJECTION INTRAVENOUS at 08:45

## 2020-11-05 RX ADMIN — VANCOMYCIN HYDROCHLORIDE 1250 MG: 10 INJECTION, POWDER, LYOPHILIZED, FOR SOLUTION INTRAVENOUS at 14:54

## 2020-11-05 RX ADMIN — PHENYLEPHRINE HYDROCHLORIDE 80 MCG: 10 INJECTION INTRAVENOUS at 08:43

## 2020-11-05 NOTE — H&P
"Pre-Op H&P  Adiel Lopez  [id number]  1945    Chief complaint: low back pain/numbness    HPI:    Patient is a 74 y.o.male who presents with a history of pain and numbness in the lower back. The patient states that the pain sometimes radiates down his right lower extremity. He has failed to adequately respond to conservative treatment and presents to the operating room today for surgical management.      Review of Systems:  General ROS: negative for chills, fever or skin lesions;  No changes since last office visit.  Neg for recent sick exposure  Cardiovascular ROS: no chest pain or dyspnea on exertion  Respiratory ROS: no cough, shortness of breath, or wheezing    Allergies:   Allergies   Allergen Reactions   • Penicillins Rash     Allergic to \"cillin\" family.       Home Meds:    No current facility-administered medications on file prior to encounter.      Current Outpatient Medications on File Prior to Encounter   Medication Sig Dispense Refill   • lisinopril (PRINIVIL,ZESTRIL) 20 MG tablet Take 20 mg by mouth Daily.     • omeprazole (priLOSEC) 20 MG capsule Take 20 mg by mouth Daily.         PMH:   Past Medical History:   Diagnosis Date   • Arthritis    • Back pain    • Cancer (CMS/HCC)     basal cell skin, ear   • GERD (gastroesophageal reflux disease)    • Hypertension    • PONV (postoperative nausea and vomiting)    • Wears dentures     upper   • Wears glasses      PSH:    Past Surgical History:   Procedure Laterality Date   • APPENDECTOMY  1959   • BACK SURGERY  2010   • CATARACT EXTRACTION, BILATERAL  2005   • COLONOSCOPY  2018   • LUMBAR DISCECTOMY N/A 4/27/2018    Procedure: LUMBAR DISCECTOMY REVISION L4-5;  Surgeon: Wojciech Kathleen MD;  [facility];  Service: Orthopedic Spine   • SKIN BIOPSY  2017    basal cell       Immunization History:  Influenza: 2020  Pneumococcal: denies  Tetanus: patient unsure of date     Social History:   Tobacco:   Social History     Tobacco Use   Smoking Status " "Never Smoker   Smokeless Tobacco Never Used      Alcohol:     Social History     Substance and Sexual Activity   Alcohol Use No       Vitals:           /84 (BP Location: Right arm, Patient Position: Lying)   Pulse 91   Temp 97.8 °F (36.6 °C) (Temporal)   Resp 16   Ht 172.7 cm (68\")   Wt 81.2 kg (179 lb)   SpO2 99%   BMI 27.22 kg/m²     Physical Exam:  General Appearance:    Alert, cooperative, no distress, appears stated age   Head:    Normocephalic, without obvious abnormality, atraumatic   Lungs:     Clear to auscultation bilaterally, respirations unlabored    Heart:   Regular rate and rhythm, S1 and S2 normal, no murmur, rub    or gallop    Abdomen:    Soft, nontender.  +bowel sounds   Breast Exam:    deferred   Genitalia:    deferred   Extremities:   Extremities normal, atraumatic, no cyanosis or edema   Skin:   Skin color, texture, turgor normal, no rashes or lesions   Neurologic:   Grossly intact   Results Review  LABS:  Lab Results   Component Value Date    WBC 11.28 (H) 11/03/2020    HGB 14.8 11/03/2020    HCT 44.8 11/03/2020    MCV 96.6 11/03/2020     11/03/2020    K 4.5 11/03/2020       RADIOLOGY:  Imaging Results (Last 72 Hours)     ** No results found for the last 72 hours. **          Cancer Staging (if applicable)  Cancer Patient: __ yes _x_no __unknown; If yes, clinical stage T:__ N:__M:__, stage group or __N/A    Impression: Recurrent lumbar disc herniation    Plan: Lumbar spinal fusion    Petey Chaves PA-C   11/5/2020   06:58 EST  "

## 2020-11-05 NOTE — ANESTHESIA POSTPROCEDURE EVALUATION
Patient: Adiel Lopez    Procedure Summary     Date: 11/05/20 Room / Location:  [facility] [address] [place] [address] [facility]    Anesthesia Start: 0737 Anesthesia Stop:     Procedure: L4-5 LUMBAR SPINAL FUSION (N/A Spine Lumbar) Diagnosis:     Surgeon: Wojciech Kathleen MD Provider: Horace Partida MD    Anesthesia Type: general ASA Status: 2          Anesthesia Type: general    Vitals  Vitals Value Taken Time   /75 11/05/20 1043   Temp     Pulse     Resp     SpO2 99 % 11/05/20 1044   Vitals shown include unvalidated device data.        Post Anesthesia Care and Evaluation    Patient location during evaluation: PACU  Patient participation: complete - patient participated  Level of consciousness: responsive to light touch  Pain score: 0  Pain management: adequate  Airway patency: patent  Anesthetic complications: No anesthetic complications  PONV Status: none  Cardiovascular status: hemodynamically stable and acceptable  Respiratory status: nonlabored ventilation, acceptable and nasal cannula  Hydration status: acceptable

## 2020-11-05 NOTE — H&P
"Patient Name: Adiel Lopez  MRN: [id number]  : 1945  DOS: 2020    Attending: Wojciech Kathleen MD    Primary Care Provider: Mamadou Mckinney MD      Chief complaint:  Spinal stenosis, back pain, numbness.    Subjective   Patient is a pleasant 74 y.o. male presented for scheduled surgery by .    Patient has had previous back surgeries.  For several months he has had pain in his lower back with radiation down his right lower extremity.  This has interfered with his daily activities and he has not been able to walk due to pain.  He was diagnosed with severe spinal stenosis at L4-L5 as well as radiculopathy L4-L5.  Today he underwent the following procedures under general anesthesia:  1.  Decompressive laminectomy, bilateral medial facetectomy and bilateral foraminotomies, L4-L5, to decompress neural elements.  2.  Transforaminal lumbar interbody fusion L4-L5 with interbody fusion cage and bone graft.  3.  Segmental instrumentation L4-L5 with DePuy transpedicular fixation.   4.  Posterolateral fusion of L4-L5 with bone graft.   5.  Harvesting of bone graft locally from spinous processes and lamina.   He tolerated surgery well and was admitted for further management.  Seen in his room postoperatively, doing fairly well, good pain control.  No   vomiting.  Some nausea on getting up.    Significant improvement in preoperative pain with improved sensation in the right leg and improved pain.    Allergies   Allergen Reactions   • Penicillins Rash     Allergic to \"cillin\" family.       Meds:  Medications Prior to Admission   Medication Sig Dispense Refill Last Dose   • amLODIPine (NORVASC) 5 MG tablet Take 5 mg by mouth Daily.   2020 at 0430   • lisinopril (PRINIVIL,ZESTRIL) 20 MG tablet Take 20 mg by mouth Daily.   2020 at 0700   • omeprazole (priLOSEC) 20 MG capsule Take 20 mg by mouth Daily.   2020 at 0430   • traZODone (DESYREL) 50 MG tablet Take 50 mg by mouth Every Night.   " "11/4/2020 at 2300         History:   Past Medical History:   Diagnosis Date   • Arthritis    • Back pain    • Cancer (CMS/HCC)     basal cell skin, ear   • GERD (gastroesophageal reflux disease)    • Hypertension    • PONV (postoperative nausea and vomiting)    • Wears dentures     upper   • Wears glasses      Past Surgical History:   Procedure Laterality Date   • APPENDECTOMY  1959   • BACK SURGERY  2010   • CATARACT EXTRACTION, BILATERAL  2005   • COLONOSCOPY  2018   • LUMBAR DISCECTOMY N/A 4/27/2018    Procedure: LUMBAR DISCECTOMY REVISION L4-5;  Surgeon: Wojciech Ktahleen MD;  Location: [facility];  Service: Orthopedic Spine   • SKIN BIOPSY  2017    basal cell     History reviewed. No pertinent family history.  Social History     Tobacco Use   • Smoking status: Never Smoker   • Smokeless tobacco: Never Used   Substance Use Topics   • Alcohol use: No   • Drug use: No   .  Retired from [employer].  Has 1 daughter.    Review of Systems  Pertinent items are noted in HPI, all other systems reviewed and negative    Vital Signs  /70 (BP Location: Right arm, Patient Position: Lying)   Pulse 89   Temp 98.4 °F (36.9 °C) (Oral)   Resp 20   Ht 172.7 cm (68\")   Wt 81.2 kg (179 lb)   SpO2 100%   BMI 27.22 kg/m²     Physical Exam:    General Appearance:    Alert, cooperative, in no acute distress   Head:    Normocephalic, without obvious abnormality, atraumatic   Eyes:            Lids and lashes normal, conjunctivae and sclerae normal, no   icterus, no pallor, corneas clear,    Ears:    Ears appear intact with no abnormalities noted   Throat:   No oral lesions, no thrush, oral mucosa moist   Neck:   No adenopathy, supple, trachea midline, no thyromegaly   Back: Clean dry and intact dressing, drain to Hemovac present.        Lungs:     Clear to auscultation,respirations regular, even and                   unlabored    Heart:    Regular rhythm and normal rate, normal S1 and S2, no            murmur, no gallop "   Abdomen:     Normal bowel sounds, no masses, no organomegaly, soft        non-tender, non-distended, no guarding, no rebound                 tenderness   Genitalia:    Deferred   Extremities:   Moves all extremities well, no edema, no cyanosis, no              redness   Pulses:   Pulses palpable and equal bilaterally   Skin:   No bleeding, bruising or rash   Neurologic:   Cranial nerves 2 - 12 grossly intact, intact flexion dorsiflexion bilateral feet.      I reviewed the patient's new clinical results.       Results from last 7 days   Lab Units 11/03/20  1336   WBC 10*3/mm3 11.28*   HEMOGLOBIN g/dL 14.8   HEMATOCRIT % 44.8   PLATELETS 10*3/mm3 358     Results from last 7 days   Lab Units 11/03/20  1336   POTASSIUM mmol/L 4.5     Lab Results   Component Value Date    HGBA1C 5.70 (H) 11/03/2020       Assessment and Plan:       Spinal stenosis of lumbar region, status post fusion    Back pain    Essential hypertension    GERD without esophagitis  Borderline elevation hemoglobin A1c    Plan  1. PT/OT.  Ambulate.  2. Pain control-prns   3. IS-encourage  4. DVT proph- mechanicals.  5. Bowel regimen  6. Resume home medications as appropriate  7. Monitor post-op labs  8. DC planning for home.    - Hypertension:  Resume home medications as appropriate, formulary substitution when indicated.  Holding parameters.  Prn medications for elevated blood pressure.    -Explained to patient implication of A1C elevation, need to modify diet and increase activity, and f/u with PCP.         Dragon disclaimer:  Part of this encounter note is an electronic transcription/translation of spoken language to printed text. The electronic translation of spoken language may permit erroneous, or at times, nonsensical words or phrases to be inadvertently transcribed; Although I have reviewed the note for such errors, some may still exist.    Jelly Graham MD  11/05/20  15:06 EST

## 2020-11-05 NOTE — ANESTHESIA PROCEDURE NOTES
Airway  Urgency: elective    Date/Time: 11/5/2020 7:43 AM  Airway not difficult    General Information and Staff    Patient location during procedure: OR  CRNA: Asim Garcia CRNA    Indications and Patient Condition  Indications for airway management: airway protection    Preoxygenated: yes  MILS not maintained throughout  Mask difficulty assessment: 1 - vent by mask    Final Airway Details  Final airway type: endotracheal airway      Successful airway: ETT  Cuffed: yes   Successful intubation technique: direct laryngoscopy  Facilitating devices/methods: intubating stylet  Endotracheal tube insertion site: oral  Blade: Matt  Blade size: 2  ETT size (mm): 7.5  Cormack-Lehane Classification: grade I - full view of glottis  Placement verified by: chest auscultation and capnometry   Measured from: lips  ETT/EBT  to lips (cm): 20  Number of attempts at approach: 1  Assessment: lips, teeth, and gum same as pre-op and atraumatic intubation    Additional Comments  Negative epigastric sounds, Breath sound equal bilaterally with symmetric chest rise and fall

## 2020-11-05 NOTE — OP NOTE
PREOPERATIVE DIAGNOSES:  1.  L4-L5 severe spinal stenosis.  2.  L4-L5 radiculopathy.  3.  Postlaminectomy syndrome L4-L5.  4.  Recurrent disk herniation, L4-L5.     POSTOPERATIVE DIAGNOSES:  1.  L4-L5 severe spinal stenosis.  2.  L4-L5 radiculopathy.  3.  Postlaminectomy syndrome L4-L5.  4.  Recurrent disk herniation, L4-L5.     PROCEDURES PERFORMED:  1.  Decompressive laminectomy, bilateral medial facetectomy and bilateral foraminotomies, L4-L5, to decompress neural elements.  2.  Transforaminal lumbar interbody fusion L4-L5 with interbody fusion cage and bone graft.  3.  Segmental instrumentation L4-L5 with DePuy transpedicular fixation.   4.  Posterolateral fusion of L4-L5 with bone graft.   5.  Harvesting of bone graft locally from spinous processes and lamina.     SURGEON: Wojciech Kathleen MD    ASSISTANT: Fátima Soto PA-C (Ms. Soto's assistance was required for patient positioning, surgical approach, suctioning, for assistance with instrumentation placement, and wound closure).    ANESTHESIA: General.    DESCRIPTION OF PROCEDURE: The patient was given a preoperative dose of intravenous vancomycin. He was given a general anesthetic. He was placed in the prone position. The back was prepped and draped sterilely. A midline incision was made. The fascia was split and the paraspinal musculature and scar tissue were elevated. I identified L4-L5 by x-ray.     Pedicle screws were placed first. Under C-arm guidance I placed pedicle screws bilaterally at L4 and L5. These were DePuy pedicle screws. The bone was somewhat soft but the screws obtained reasonably good purchase. Two rods were contoured into lordosis and placed into the screws and the appropriate set screws applied.     Patient was noted to have bilateral leg symptoms. Because of this a laminectomy to decompress the neural elements was indicated. A complete laminectomy of L4 was performed. Using a cervical curette I defined the outlines of the previous  laminotomy. Scar tissue was elevated form the lamina. Using the combination of a papito and Kerrison rongeur a complete laminectomy was performed. There was moderately severe central stenosis. Bilateral medial facetectomies were performed on the left and right side. Foraminotomies were performed. The neural elements appeared decompressed.     Transforaminal lumbar interbody fusion was performed. This was performed through the right side.  An annular window was created between the exiting nerve root above and traversing nerve root medially. A complete diskectomy was performed using endplate eugene, curettes, and pituitaries. The disk was very degenerate. The endplates were lightly decorticated. The appropriate size cage was 12 mm in height and 28 mm in length. Prior to placing this cage I packed some bone graft in the anterior disk interspace. I then packed the cage with bone graft. The cage was then impacted tangentially across the disk space. It looked in good position on the C-arm, and locked into place well.  Once the cage was in I applied some compression across the screws and locked the cage in place. The construct appeared solid and everything was in good position on C-arm.     A posterolateral fusion was performed. The transverse processes of L4 and L5 on the left and right sides were decorticated. Bone graft was packed in the posterolateral gutters.    Prior to bone grafting it should be noted a final copious irrigation was performed.     It should be noted bone graft was prepared locally from bone harvested from the lamina in posterior elements. This bone was morselized. It was mixed with Vivigen allograft. The above bone was used for fusion material.     Final C-arm images showed all hardware in good position. A final torque tightening was done of all the screws. A deep Hemovac drain was placed. The wound was closed in layers using Vicryl and skin staples. A sterile dressing was applied. Patient was  awakened and transported to the recovery room in stable condition.

## 2020-11-05 NOTE — ANESTHESIA PREPROCEDURE EVALUATION
Anesthesia Evaluation     history of anesthetic complications: PONV               Airway   Mallampati: I  TM distance: >3 FB  Neck ROM: full  No difficulty expected  Dental      Pulmonary    Cardiovascular     (+) hypertension,       Neuro/Psych  GI/Hepatic/Renal/Endo    (+)  GERD,      Musculoskeletal     (+) back pain,   Abdominal    Substance History      OB/GYN          Other   arthritis,    history of cancer                    Anesthesia Plan    ASA 2     general     intravenous induction     Anesthetic plan, all risks, benefits, and alternatives have been provided, discussed and informed consent has been obtained with: patient.    Plan discussed with CRNA.

## 2020-11-06 LAB
ANION GAP SERPL CALCULATED.3IONS-SCNC: 11 MMOL/L (ref 5–15)
BUN SERPL-MCNC: 14 MG/DL (ref 8–23)
BUN/CREAT SERPL: 16.3 (ref 7–25)
CALCIUM SPEC-SCNC: 9.2 MG/DL (ref 8.6–10.5)
CHLORIDE SERPL-SCNC: 105 MMOL/L (ref 98–107)
CO2 SERPL-SCNC: 25 MMOL/L (ref 22–29)
CREAT SERPL-MCNC: 0.86 MG/DL (ref 0.76–1.27)
DEPRECATED RDW RBC AUTO: 49.1 FL (ref 37–54)
ERYTHROCYTE [DISTWIDTH] IN BLOOD BY AUTOMATED COUNT: 13.2 % (ref 12.3–15.4)
GFR SERPL CREATININE-BSD FRML MDRD: 87 ML/MIN/1.73
GLUCOSE SERPL-MCNC: 121 MG/DL (ref 65–99)
HCT VFR BLD AUTO: 39.5 % (ref 37.5–51)
HGB BLD-MCNC: 12.9 G/DL (ref 13–17.7)
MCH RBC QN AUTO: 32.8 PG (ref 26.6–33)
MCHC RBC AUTO-ENTMCNC: 32.7 G/DL (ref 31.5–35.7)
MCV RBC AUTO: 100.5 FL (ref 79–97)
PLATELET # BLD AUTO: 328 10*3/MM3 (ref 140–450)
PMV BLD AUTO: 9.1 FL (ref 6–12)
POTASSIUM SERPL-SCNC: 4.7 MMOL/L (ref 3.5–5.2)
RBC # BLD AUTO: 3.93 10*6/MM3 (ref 4.14–5.8)
SODIUM SERPL-SCNC: 141 MMOL/L (ref 136–145)
WBC # BLD AUTO: 17.06 10*3/MM3 (ref 3.4–10.8)

## 2020-11-06 PROCEDURE — 80048 BASIC METABOLIC PNL TOTAL CA: CPT | Performed by: INTERNAL MEDICINE

## 2020-11-06 PROCEDURE — A9270 NON-COVERED ITEM OR SERVICE: HCPCS | Performed by: INTERNAL MEDICINE

## 2020-11-06 PROCEDURE — A9270 NON-COVERED ITEM OR SERVICE: HCPCS | Performed by: ORTHOPAEDIC SURGERY

## 2020-11-06 PROCEDURE — 63710000001 TRAZODONE 50 MG TABLET: Performed by: INTERNAL MEDICINE

## 2020-11-06 PROCEDURE — 63710000001 FAMOTIDINE 20 MG TABLET: Performed by: ORTHOPAEDIC SURGERY

## 2020-11-06 PROCEDURE — 63710000001 LISINOPRIL 20 MG TABLET: Performed by: INTERNAL MEDICINE

## 2020-11-06 PROCEDURE — 97110 THERAPEUTIC EXERCISES: CPT

## 2020-11-06 PROCEDURE — 97162 PT EVAL MOD COMPLEX 30 MIN: CPT

## 2020-11-06 PROCEDURE — 63710000001 POLYETHYLENE GLYCOL 17 G PACK: Performed by: INTERNAL MEDICINE

## 2020-11-06 PROCEDURE — 97165 OT EVAL LOW COMPLEX 30 MIN: CPT

## 2020-11-06 PROCEDURE — 97116 GAIT TRAINING THERAPY: CPT

## 2020-11-06 PROCEDURE — 63710000001 PANTOPRAZOLE 40 MG TABLET DELAYED-RELEASE: Performed by: INTERNAL MEDICINE

## 2020-11-06 PROCEDURE — 97535 SELF CARE MNGMENT TRAINING: CPT

## 2020-11-06 PROCEDURE — 85027 COMPLETE CBC AUTOMATED: CPT | Performed by: INTERNAL MEDICINE

## 2020-11-06 PROCEDURE — 63710000001 AMLODIPINE 5 MG TABLET: Performed by: INTERNAL MEDICINE

## 2020-11-06 PROCEDURE — 63710000001 DOCUSATE SODIUM 100 MG CAPSULE: Performed by: INTERNAL MEDICINE

## 2020-11-06 PROCEDURE — 63710000001 OXYCODONE-ACETAMINOPHEN 10-325 MG TABLET: Performed by: ORTHOPAEDIC SURGERY

## 2020-11-06 RX ORDER — POLYETHYLENE GLYCOL 3350 17 G/17G
17 POWDER, FOR SOLUTION ORAL DAILY
Status: DISCONTINUED | OUTPATIENT
Start: 2020-11-06 | End: 2020-11-07 | Stop reason: HOSPADM

## 2020-11-06 RX ORDER — DOCUSATE SODIUM 100 MG/1
100 CAPSULE, LIQUID FILLED ORAL 2 TIMES DAILY
Status: DISCONTINUED | OUTPATIENT
Start: 2020-11-06 | End: 2020-11-07 | Stop reason: HOSPADM

## 2020-11-06 RX ADMIN — TRAZODONE HYDROCHLORIDE 50 MG: 50 TABLET ORAL at 20:34

## 2020-11-06 RX ADMIN — LISINOPRIL 20 MG: 20 TABLET ORAL at 08:01

## 2020-11-06 RX ADMIN — OXYCODONE HYDROCHLORIDE AND ACETAMINOPHEN 1 TABLET: 10; 325 TABLET ORAL at 12:29

## 2020-11-06 RX ADMIN — OXYCODONE HYDROCHLORIDE AND ACETAMINOPHEN 1 TABLET: 10; 325 TABLET ORAL at 18:23

## 2020-11-06 RX ADMIN — OXYCODONE HYDROCHLORIDE AND ACETAMINOPHEN 1 TABLET: 10; 325 TABLET ORAL at 04:17

## 2020-11-06 RX ADMIN — DOCUSATE SODIUM 100 MG: 100 CAPSULE ORAL at 20:34

## 2020-11-06 RX ADMIN — DOCUSATE SODIUM 100 MG: 100 CAPSULE ORAL at 12:08

## 2020-11-06 RX ADMIN — FAMOTIDINE 20 MG: 20 TABLET ORAL at 08:00

## 2020-11-06 RX ADMIN — POLYETHYLENE GLYCOL 3350 17 G: 17 POWDER, FOR SOLUTION ORAL at 12:08

## 2020-11-06 RX ADMIN — PANTOPRAZOLE SODIUM 40 MG: 40 TABLET, DELAYED RELEASE ORAL at 05:40

## 2020-11-06 RX ADMIN — AMLODIPINE BESYLATE 5 MG: 5 TABLET ORAL at 08:01

## 2020-11-06 RX ADMIN — OXYCODONE HYDROCHLORIDE AND ACETAMINOPHEN 1 TABLET: 10; 325 TABLET ORAL at 22:29

## 2020-11-06 RX ADMIN — FAMOTIDINE 20 MG: 20 TABLET ORAL at 20:34

## 2020-11-06 RX ADMIN — OXYCODONE HYDROCHLORIDE AND ACETAMINOPHEN 1 TABLET: 10; 325 TABLET ORAL at 08:01

## 2020-11-06 NOTE — PLAN OF CARE
Problem: Adult Inpatient Plan of Care  Goal: Plan of Care Review  11/6/2020 1727 by Haley Shaw, RN  Outcome: Ongoing, Progressing  Flowsheets (Taken 11/6/2020 1727)  Progress: improving  Plan of Care Reviewed With: patient  Outcome Summary: Patient VSS, A&OX4. C/o pain, PO pain meds given. Up w/ 1 assist, ambulated 350+ft this shift. Voiding spontaneously, PVR 76ml @1500. Neuro intact. Dressing CDI. Hemovac 50ml output. Possible DC/ tmr. Continue to monitor.  Goal: Patient-Specific Goal (Individualized)  11/6/2020 1727 by Haley Shaw, RN  Outcome: Ongoing, Progressing  11/6/2020 1727 by Haley Shaw, RN  Outcome: Ongoing, Progressing  Goal: Absence of Hospital-Acquired Illness or Injury  11/6/2020 1727 by Haley Shaw, BRODY  Outcome: Ongoing, Progressing  11/6/2020 1727 by Haley Shaw, RN  Outcome: Ongoing, Progressing  Intervention: Identify and Manage Fall Risk  Recent Flowsheet Documentation  Taken 11/6/2020 1630 by Haley Shaw, RN  Safety Promotion/Fall Prevention:   activity supervised   assistive device/personal items within reach   clutter free environment maintained   fall prevention program maintained   gait belt   toileting scheduled   safety round/check completed   room organization consistent   nonskid shoes/slippers when out of bed  Taken 11/6/2020 1400 by Haley Shaw, RN  Safety Promotion/Fall Prevention:   activity supervised   assistive device/personal items within reach   clutter free environment maintained   fall prevention program maintained   gait belt   toileting scheduled   safety round/check completed   room organization consistent   nonskid shoes/slippers when out of bed  Taken 11/6/2020 1229 by Haley Shaw, RN  Safety Promotion/Fall Prevention:   activity supervised   assistive device/personal items within reach   clutter free environment maintained   fall prevention program maintained   gait belt   toileting scheduled   safety round/check completed   room  organization consistent   nonskid shoes/slippers when out of bed  Taken 11/6/2020 1030 by Haley Shaw RN  Safety Promotion/Fall Prevention:   activity supervised   assistive device/personal items within reach   clutter free environment maintained   fall prevention program maintained   gait belt   toileting scheduled   safety round/check completed   room organization consistent   nonskid shoes/slippers when out of bed  Taken 11/6/2020 0800 by Haley Shaw, RN  Safety Promotion/Fall Prevention:   activity supervised   assistive device/personal items within reach   clutter free environment maintained   fall prevention program maintained   gait belt   toileting scheduled   safety round/check completed   room organization consistent   nonskid shoes/slippers when out of bed  Intervention: Prevent Skin Injury  Recent Flowsheet Documentation  Taken 11/6/2020 1630 by Haley Shaw RN  Body Position: dangle, side of bed  Taken 11/6/2020 1400 by Haley Shaw RN  Body Position: dangle, side of bed  Taken 11/6/2020 1229 by Haley Shaw RN  Body Position: (up in chair) other (see comments)  Taken 11/6/2020 1030 by Haley Shaw RN  Body Position: (up in chair) other (see comments)  Taken 11/6/2020 0800 by Haley Shaw RN  Body Position: dangle, side of bed  Intervention: Prevent and Manage VTE (venous thromboembolism) Risk  Recent Flowsheet Documentation  Taken 11/6/2020 1630 by Haley Shaw RN  VTE Prevention/Management:   bilateral   sequential compression devices on  Taken 11/6/2020 1400 by Haley Shaw RN  VTE Prevention/Management:   bilateral   sequential compression devices on  Taken 11/6/2020 1229 by Haley Shaw RN  VTE Prevention/Management:   bilateral   sequential compression devices off  Taken 11/6/2020 1030 by Haley Shaw RN  VTE Prevention/Management:   bilateral   sequential compression devices off  Taken 11/6/2020 0800 by Haley Shaw RN  VTE Prevention/Management:    bilateral   sequential compression devices off  Intervention: Prevent Infection  Recent Flowsheet Documentation  Taken 11/6/2020 1630 by Haley Shaw, RN  Infection Prevention: environmental surveillance performed  Taken 11/6/2020 1400 by Haley Shaw RN  Infection Prevention: environmental surveillance performed  Taken 11/6/2020 1229 by Haley Shaw RN  Infection Prevention: environmental surveillance performed  Taken 11/6/2020 1030 by Haley Shaw RN  Infection Prevention: environmental surveillance performed  Taken 11/6/2020 0800 by Haley Shaw RN  Infection Prevention: environmental surveillance performed  Goal: Optimal Comfort and Wellbeing  11/6/2020 1727 by Haley Shaw RN  Outcome: Ongoing, Progressing  11/6/2020 1727 by Haley Shaw RN  Outcome: Ongoing, Progressing  Intervention: Provide Person-Centered Care  Recent Flowsheet Documentation  Taken 11/6/2020 1630 by Haley Shaw RN  Trust Relationship/Rapport: care explained  Taken 11/6/2020 1030 by Haley Shaw RN  Trust Relationship/Rapport: care explained  Taken 11/6/2020 0800 by Haley Shaw RN  Trust Relationship/Rapport:   care explained   choices provided   questions encouraged   questions answered   reassurance provided   thoughts/feelings acknowledged  Goal: Readiness for Transition of Care  11/6/2020 1727 by Haley Shaw RN  Outcome: Ongoing, Progressing  11/6/2020 1727 by Haley Shaw RN  Outcome: Ongoing, Progressing     Problem: Fall Injury Risk  Goal: Absence of Fall and Fall-Related Injury  11/6/2020 1727 by Haley Shaw RN  Outcome: Ongoing, Progressing  11/6/2020 1727 by Haley Shaw RN  Outcome: Ongoing, Progressing  Intervention: Identify and Manage Contributors to Fall Injury Risk  Recent Flowsheet Documentation  Taken 11/6/2020 0800 by Haley Shaw RN  Medication Review/Management: medications reviewed  Intervention: Promote Injury-Free Environment  Recent Flowsheet  Documentation  Taken 11/6/2020 1630 by Haley Shaw, RN  Safety Promotion/Fall Prevention:   activity supervised   assistive device/personal items within reach   clutter free environment maintained   fall prevention program maintained   gait belt   toileting scheduled   safety round/check completed   room organization consistent   nonskid shoes/slippers when out of bed  Taken 11/6/2020 1400 by Haley Shaw, RN  Safety Promotion/Fall Prevention:   activity supervised   assistive device/personal items within reach   clutter free environment maintained   fall prevention program maintained   gait belt   toileting scheduled   safety round/check completed   room organization consistent   nonskid shoes/slippers when out of bed  Taken 11/6/2020 1229 by Haley Shaw, RN  Safety Promotion/Fall Prevention:   activity supervised   assistive device/personal items within reach   clutter free environment maintained   fall prevention program maintained   gait belt   toileting scheduled   safety round/check completed   room organization consistent   nonskid shoes/slippers when out of bed  Taken 11/6/2020 1030 by Haley Shaw, RN  Safety Promotion/Fall Prevention:   activity supervised   assistive device/personal items within reach   clutter free environment maintained   fall prevention program maintained   gait belt   toileting scheduled   safety round/check completed   room organization consistent   nonskid shoes/slippers when out of bed  Taken 11/6/2020 0800 by Haley Shaw, RN  Safety Promotion/Fall Prevention:   activity supervised   assistive device/personal items within reach   clutter free environment maintained   fall prevention program maintained   gait belt   toileting scheduled   safety round/check completed   room organization consistent   nonskid shoes/slippers when out of bed     Problem: Bleeding (Spinal Surgery)  Goal: Absence of Bleeding  Outcome: Ongoing, Progressing     Problem: Bowel Elimination  Impaired (Spinal Surgery)  Goal: Effective Bowel Elimination  Outcome: Ongoing, Progressing     Problem: Functional Ability Impaired (Spinal Surgery)  Goal: Optimal Functional Ability  Outcome: Ongoing, Progressing  Intervention: Optimize Functional Status  Recent Flowsheet Documentation  Taken 11/6/2020 1630 by Haley Shaw RN  Positioning/Transfer Devices:   pillows   in use  Taken 11/6/2020 1400 by Haley Shaw, BRODY  Positioning/Transfer Devices:   pillows   in use  Taken 11/6/2020 1229 by Haley Shaw RN  Positioning/Transfer Devices:   pillows   in use  Taken 11/6/2020 1030 by Haley Shaw, BRODY  Positioning/Transfer Devices:   pillows   in use  Taken 11/6/2020 0800 by Haley Shaw RN  Positioning/Transfer Devices:   pillows   in use     Problem: Infection (Spinal Surgery)  Goal: Absence of Infection Signs and Symptoms  Outcome: Ongoing, Progressing     Problem: Neurologic Impairment (Spinal Surgery)  Goal: Optimal Neurologic Function  Outcome: Ongoing, Progressing  Intervention: Optimize Neurologic Function  Recent Flowsheet Documentation  Taken 11/6/2020 1630 by Haley Shaw RN  Body Position: dangle, side of bed  Taken 11/6/2020 1400 by Haley Shaw RN  Body Position: dangle, side of bed  Taken 11/6/2020 1229 by Haley Shaw RN  Body Position: (up in chair) other (see comments)  Taken 11/6/2020 1030 by Haley Shaw RN  Body Position: (up in chair) other (see comments)  Taken 11/6/2020 0800 by Haley Shaw RN  Body Position: dangle, side of bed     Problem: Ongoing Anesthesia Effects (Spinal Surgery)  Goal: Anesthesia/Sedation Recovery  Outcome: Ongoing, Progressing  Intervention: Optimize Anesthesia Recovery  Recent Flowsheet Documentation  Taken 11/6/2020 1630 by Haley Shaw RN  Safety Promotion/Fall Prevention:   activity supervised   assistive device/personal items within reach   clutter free environment maintained   fall prevention program maintained   gait belt    toileting scheduled   safety round/check completed   room organization consistent   nonskid shoes/slippers when out of bed  Taken 11/6/2020 1400 by Haley Shaw RN  Safety Promotion/Fall Prevention:   activity supervised   assistive device/personal items within reach   clutter free environment maintained   fall prevention program maintained   gait belt   toileting scheduled   safety round/check completed   room organization consistent   nonskid shoes/slippers when out of bed  Taken 11/6/2020 1229 by Haley Shaw RN  Safety Promotion/Fall Prevention:   activity supervised   assistive device/personal items within reach   clutter free environment maintained   fall prevention program maintained   gait belt   toileting scheduled   safety round/check completed   room organization consistent   nonskid shoes/slippers when out of bed  Taken 11/6/2020 1030 by Haley Shaw, RN  Safety Promotion/Fall Prevention:   activity supervised   assistive device/personal items within reach   clutter free environment maintained   fall prevention program maintained   gait belt   toileting scheduled   safety round/check completed   room organization consistent   nonskid shoes/slippers when out of bed  Taken 11/6/2020 0800 by Haley Shaw, RN  Safety Promotion/Fall Prevention:   activity supervised   assistive device/personal items within reach   clutter free environment maintained   fall prevention program maintained   gait belt   toileting scheduled   safety round/check completed   room organization consistent   nonskid shoes/slippers when out of bed     Problem: Pain (Spinal Surgery)  Goal: Acceptable Pain Control  Outcome: Ongoing, Progressing  Intervention: Prevent or Manage Pain  Recent Flowsheet Documentation  Taken 11/6/2020 1229 by Haley Shaw, RN  Pain Management Interventions: see MAR  Taken 11/6/2020 0800 by Haley Shaw RN  Pain Management Interventions: see MAR     Problem: Postoperative Nausea and  Vomiting (Spinal Surgery)  Goal: Nausea and Vomiting Relief  Outcome: Ongoing, Progressing     Problem: Postoperative Urinary Retention (Spinal Surgery)  Goal: Effective Urinary Elimination  Outcome: Ongoing, Progressing   Goal Outcome Evaluation:  Plan of Care Reviewed With: patient  Progress: improving  Outcome Summary: Patient VSS, A&OX4. C/o pain, PO pain meds given. Up w/ 1 assist. Voiding spontaneously, PVR 76ml @1500. Neuro intact. Dressing CDI. Hemovac 50ml output. Possible DC/ tmr. Continue to monitor.

## 2020-11-06 NOTE — THERAPY EVALUATION
Patient Name: Adiel Lopez  : 1945    MRN: [id number]                              Today's Date: 2020       Admit Date: 2020    Visit Dx: No diagnosis found.  Patient Active Problem List   Diagnosis   • Back pain   • Essential hypertension   • GERD without esophagitis   • Spinal stenosis of lumbar region, status post fusion     Past Medical History:   Diagnosis Date   • Arthritis    • Back pain    • Cancer (CMS/HCC)     basal cell skin, ear   • GERD (gastroesophageal reflux disease)    • Hypertension    • PONV (postoperative nausea and vomiting)    • Wears dentures     upper   • Wears glasses      Past Surgical History:   Procedure Laterality Date   • APPENDECTOMY     • BACK SURGERY     • CATARACT EXTRACTION, BILATERAL     • COLONOSCOPY     • LUMBAR DISCECTOMY N/A 2018    Procedure: LUMBAR DISCECTOMY REVISION L4-5;  Surgeon: Wojciech Kathleen MD;  Location:  [facility];  Service: Orthopedic Spine   • LUMBAR DISCECTOMY FUSION INSTRUMENTATION N/A 2020    Procedure: LUMBAR SPINAL FUSION L4-5;  Surgeon: Wojciech Kathleen MD;  Location:  [facility];  Service: Orthopedic Spine;  Laterality: N/A;   • SKIN BIOPSY  2017    basal cell     General Information     Row Name 20 0855          OT Time and Intention    Document Type  evaluation  -JY     Mode of Treatment  occupational therapy;individual therapy  -JY     Row Name 20 0855          General Information    Patient Profile Reviewed  yes  -JY     Prior Level of Function  min assist:;all household mobility;gait;transfer;bed mobility;dressing;bathing;home management;cooking;cleaning;independent:;driving;yard work;feeding;grooming pt limited by back pain; utilized SPC for mobility; utilized 2 steps to get in/out bed  -JY     Existing Precautions/Restrictions  fall;spinal hemovac drain  -JY     Barriers to Rehab  previous functional deficit  -JY     Row Name 20 0855          Living Environment    Lives With  spouse  -JY      Row Name 11/06/20 0855          Home Main Entrance    Number of Stairs, Main Entrance  none  -JY     Stair Railings, Main Entrance  none  -JY     Row Name 11/06/20 0855          Stairs Within Home, Primary    Stairs, Within Home, Primary  0  -JY     Number of Stairs, Within Home, Primary  none  -JY     Stair Railings, Within Home, Primary  none  -JY     Row Name 11/06/20 0855          Cognition    Orientation Status (Cognition)  oriented x 4  -JY     Row Name 11/06/20 0855          Safety Issues, Functional Mobility    Safety Issues Affecting Function (Mobility)  insight into deficits/self-awareness;safety precaution awareness;safety precautions follow-through/compliance;other (see comments) pt not initially cognizant of spinal precautions thus reiterated in mobility  -JY     Impairments Affecting Function (Mobility)  pain;endurance/activity tolerance  -JY     Comment, Safety Issues/Impairments (Mobility)  pt cued for adherence to spinal precautions, grossly no twisting in fxl mobility; to turn gross body when attending to people, objects in transit  -JY       User Key  (r) = Recorded By, (t) = Taken By, (c) = Cosigned By    Initials Name Provider Type    Livia Rhodes OT Occupational Therapist        Mobility/ADL's     Row Name 11/06/20 0900          Bed Mobility    Bed Mobility  other (see comments) pt received sitting at EOB and pt preferred to transition to recliner thus bed mobility not assessed  -JY     Comment (Bed Mobility)  OT educated pt on bed mobility with log roll tech to adhere to spinal precautions however u/a to assess initially; pt verbalized understanding of tech  -JY     Row Name 11/06/20 0900          Transfers    Transfers  sit-stand transfer;toilet transfer  -JY     Comment (Transfers)  skilled cues for optimal hand placement for controlled ascend/descend and adherence to spinal precautions (largely forward flexion) in transition  -JY     Sit-Stand [place] (Transfers)  standby  assist;verbal cues  -JY     [place] Level (Toilet Transfer)  contact guard;verbal cues  -JY     Assistive Device (Toilet Transfer)  commode;grab bars/safety frame;raised toilet seat;other (see comments) gait belt  -     Row Name 11/06/20 0900          Sit-Stand Transfer    Assistive Device (Sit-Stand Transfers)  other (see comments) gait belt  -Sudiksha     Row Name 11/06/20 0900          Toilet Transfer    Type (Toilet Transfer)  stand pivot/stand step  -[facility] Name 11/06/20 0900          Functional Mobility    Functional Mobility- Ind. Level  standby assist;verbal cues required  -     Functional Mobility- Device  other (see comments) gait belt  -     Functional Mobility-Distance (Feet)  -- in room distances for ADLs  -J     Functional Mobility- Comment  cues for sustained adherence to spinal precautions kenna in transition, turning; no AD utilized and no LOB noted, grossly SBA  -[facility] Name 11/06/20 0900          Activities of Daily Living    BADL Assessment/Intervention  upper body dressing;bathing;lower body dressing;toileting  -[facility] Name 11/06/20 0900          Upper Body Dressing Assessment/Training    [place] Level (Upper Body Dressing)  don;pajama/robe;contact guard assist;verbal cues;other (see comments) CGA grossly for posterior grasp, mgmt while adhering to spinal prec  -JY     Position (Upper Body Dressing)  unsupported standing  -[facility] Name 11/06/20 0900          Bathing Assessment/Intervention    [place] Level (Bathing)  distal lower extremities/feet;lower body;not tested  -JY     Assistive Devices (Bathing)  long-handled sponge  -JY     Position (Bathing)  supported sitting  -JY     Comment (Bathing)  OT issued LH sponge in order to bathe LB while adhering to spinal precautions; OT educated on use, grasp and tech and pt demo'd competency: OT did not authentically assess bathing this date  -[facility] Name 11/06/20 0900          Lower Body Dressing Assessment/Training     [place] Level (Lower Body Dressing)  socks;doff;standby assist;don;maximum assist (25% patient effort);verbal cues  -JY     Assistive Devices (Lower Body Dressing)  reacher;sock-aid  -JY     Position (Lower Body Dressing)  unsupported sitting  -JY     Comment (Lower Body Dressing)  pt able to doff B socks with crossed leg tech however only able to re don R sock with increased effort/time and u/a to re don L sock while adhering to precautions w/o AE, once issued AE pt able to don with SBA and cues  -JY     Row Name 11/06/20 0900          Toileting Assessment/Training    [place] Level (Toileting)  adjust/manage clothing;perform perineal hygiene;supervision;verbal cues  -JY     Assistive Devices (Toileting)  commode;grab bar/safety frame;raised toilet seat;toilet paper aid  -JY     Position (Toileting)  unsupported sitting;supported standing  -JY     Comment (Toileting)  OT educated pt on adhering to spinal prec while completing posterior hygiene and issued wipe assist of which pt able to verb correct use s/p education  -JY       User Key  (r) = Recorded By, (t) = Taken By, (c) = Cosigned By    Initials Name Provider Type    Livia Rhodes OT Occupational Therapist        Obj/Interventions     Row Name 11/06/20 0925          Sensory Assessment (Somatosensory)    Sensory Assessment (Somatosensory)  bilateral UE;sensation intact  -     Bilateral UE Sensory Assessment  general sensation;light touch awareness  -     Row Name 11/06/20 0925          Sensory Interventions    Comment, Sensory Intervention  pt denies any numbness, tingling and able to identify all LT stimuli at BUEs  -     Row Name 11/06/20 0925          Vision Assessment/Intervention    Visual Impairment/Limitations  corrective lenses for reading  -     Row Name 11/06/20 0925          Range of Motion Comprehensive    Comment, General Range of Motion  BUE AROM appears WFL  -     Row Name 11/06/20 0925          Strength Comprehensive  (MMT)    General Manual Muscle Testing (MMT) Assessment  no strength deficits identified  -JY     Comment, General Manual Muscle Testing (MMT) Assessment  pt able to demo at least 4/5 MMS at Es however modified assessment with respect to pain, fxl strength  -JY     Row Name 11/06/20 0936          Balance    Balance Assessment  sitting static balance;standing static balance;standing dynamic balance  -JY     Static Sitting Balance  WNL;supported;sitting in chair;unsupported;sitting, edge of bed  -JY     Static Standing Balance  WFL;unsupported;standing  -JY     Dynamic Standing Balance  WFL;supported;standing  -JY     Balance Interventions  sitting;standing;static;dynamic;sit to stand;supported;occupation based/functional task  -JY     Comment, Balance  pt completed standing tasks with no AD and no noted LOB  -JY       User Key  (r) = Recorded By, (t) = Taken By, (c) = Cosigned By    Initials Name Provider Type    Livia Rhodes OT Occupational Therapist        Goals/Plan     Row Name 11/06/20 0929          Bed Mobility Goal 1 (OT)    Activity/Assistive Device (Bed Mobility Goal 1, OT)  rolling to left;rolling to right;sidelying to sit/sit to sidelying  -JY     [place] Level/Cues Needed (Bed Mobility Goal 1, OT)  supervision required;verbal cues required  -JY     Time Frame (Bed Mobility Goal 1, OT)  long term goal (LTG);by discharge  -JY     Strategies/Barriers (Bed Mobility Goal 1, OT)  with adherence to spinal precautions  -JY     Row Name 11/06/20 0935          Transfer Goal 1 (OT)    Activity/Assistive Device (Transfer Goal 1, OT)  sit-to-stand/stand-to-sit;bed-to-chair/chair-to-bed;toilet;commode  -JY     [place] Level/Cues Needed (Transfer Goal 1, OT)  supervision required;verbal cues required  -JY     Time Frame (Transfer Goal 1, OT)  long term goal (LTG);by discharge  -JY     Strategies/Barriers (Transfers Goal 1, OT)  with adherence to spinal precautions  -JY     Progress/Outcome (Transfer  Goal 1, OT)  goal ongoing  -JY     Row Name 11/06/20 0935          Dressing Goal 1 (OT)    Activity/Device (Dressing Goal 1, OT)  lower body dressing;other (see comments) AE PRN  -JY     [place]/Cues Needed (Dressing Goal 1, OT)  supervision required;verbal cues required  -JY     Time Frame (Dressing Goal 1, OT)  long term goal (LTG);by discharge  -JY     Strategies/Barriers (Dressing Goal 1, OT)  with adherence to spinal precautions  -JY     Progress/Outcome (Dressing Goal 1, OT)  goal ongoing  -JY     Row Name 11/06/20 0935          Toileting Goal 1 (OT)    Activity/Device (Toileting Goal 1, OT)  adjust/manage clothing;perform perineal hygiene;commode;grab bar/safety frame;raised toilet seat;toilet paper aid AE PRN  -JY     [place] Level/Cues Needed (Toileting Goal 1, OT)  supervision required;verbal cues required  -JY     Time Frame (Toileting Goal 1, OT)  long term goal (LTG);by discharge  -JY     Strategies/Barriers (Toileting Goal 1, OT)  with adherence to spinal precautions  -JY     Progress/Outcome (Toileting Goal 1, OT)  goal ongoing  -JY       User Key  (r) = Recorded By, (t) = Taken By, (c) = Cosigned By    Initials Name Provider Type    Livia Rhodes, OT Occupational Therapist        Clinical Impression     Row Name 11/06/20 0928          Pain Assessment    Additional Documentation  Pain Scale: Numbers Pre/Post-Treatment (Group)  -JY     Row Name 11/06/20 0928          Pain Scale: Numbers Pre/Post-Treatment    Pretreatment Pain Rating  6/10  -JY     Posttreatment Pain Rating  6/10  -JY     Pain Location - Side  Bilateral  -JY     Pain Location - Orientation  incisional  -JY     Pain Location  back  -JY     Pre/Posttreatment Pain Comment  pt tolerated interventions throughout  -JY     Pain Intervention(s)  Ambulation/increased activity;Repositioned;Elevated pt deferred ice pack  -JY     Row Name 11/06/20 0928          Plan of Care Review    Plan of Care Reviewed With  patient;spouse  -IGNACIO      Progress  improving  -JY     Outcome Summary  OT IE completed post chart review. Pt presents with mild decrease in occupational I in ADLs, related t/f compared to PLOF. Pt grossly limited by pain, decreased fxl endurance and limited distal reach with decreased competency on spinal prec. OT educated pt on prec and log roll tech with improved understanding throughout. Pt completed sit < > Stand at EOB with SBA, CGA toilet t/f, gross SBA with fxl mobility with no AD, gaita belt support. Pt completed toileting with spv, UBD with CGA (posterior mgmt), hand hygiene with I and LBD with progression from max A d/d socks to SBA-CGA with AE. OT educated pt on each AE and pt initiated return demo.Pt could benefit from one more session for AE application to ADLs, however spouse present and cognizant on AE. Recommend home with assistance at d/c when medically ready.  -JY     Row Name 11/06/20 0928          Therapy Assessment/Plan (OT)    Patient/Family Therapy Goal Statement (OT)  to increase I in ADLs, related t/f, return to PLOF  -JY     Rehab Potential (OT)  good, to achieve stated therapy goals  -JY     Criteria for Skilled Therapeutic Interventions Met (OT)  yes;skilled treatment is necessary  -JY     Therapy Frequency (OT)  daily  -JY     Row Name 11/06/20 0928          Therapy Plan Review/Discharge Plan (OT)    Equipment Needs Upon Discharge (OT)  dressing equipment;bathing equipment  -JY     Anticipated Discharge Disposition (OT)  home with assist  -JY     Row Name 11/06/20 0928          Vital Signs    Pre Systolic BP Rehab  143  -JY     Pre Treatment Diastolic BP  78  -JY     Pretreatment Heart Rate (beats/min)  98  -JY     Posttreatment Heart Rate (beats/min)  91  -JY     Pre SpO2 (%)  95  -JY     O2 Delivery Pre Treatment  room air  -JY     O2 Delivery Intra Treatment  room air  -JY     Post SpO2 (%)  95  -JY     O2 Delivery Post Treatment  room air  -JY     Pre Patient Position  Sitting  -JY     Post Patient Position   Sitting  -JY     Row Name 11/06/20 0928          Positioning and Restraints    Pre-Treatment Position  other (comment) EOB sitting  -JY     Post Treatment Position  chair  -JY     In Chair  notified nsg;reclined;call light within reach;encouraged to call for assist;exit alarm on;with family/caregiver;legs elevated pt deferred ice packs, hemovac intact  -JY       User Key  (r) = Recorded By, (t) = Taken By, (c) = Cosigned By    Initials Name Provider Type    Livia Rhodes OT Occupational Therapist        Outcome Measures     Row Name 11/06/20 0936          How much help from another is currently needed...    Putting on and taking off regular lower body clothing?  3  -JY     Bathing (including washing, rinsing, and drying)  3  -JY     Toileting (which includes using toilet bed pan or urinal)  3  -JY     Putting on and taking off regular upper body clothing  3  -JY     Taking care of personal grooming (such as brushing teeth)  4  -JY     Eating meals  4  -JY     AM-PAC 6 Clicks Score (OT)  20  -JY     Row Name 11/06/20 0936          Functional Assessment    Outcome Measure Options  AM-PAC 6 Clicks Daily Activity (OT)  -JY       User Key  (r) = Recorded By, (t) = Taken By, (c) = Cosigned By    Initials Name Provider Type    Livia Rhodes OT Occupational Therapist        Occupational Therapy Education                 Title: PT OT SLP Therapies (Done)     Topic: Occupational Therapy (Done)     Point: ADL training (Done)     Description:   Instruct learner(s) on proper safety adaptation and remediation techniques during self care or transfers.   Instruct in proper use of assistive devices.              Learning Progress Summary           Patient Acceptance, E,D, VU,DU,NR by IGNACIO at 11/6/2020 0822   Family Acceptance, E,D, VU,DU,NR by IGNACIO at 11/6/2020 0822                   Point: Precautions (Done)     Description:   Instruct learner(s) on prescribed precautions during self-care and functional transfers.               Learning Progress Summary           Patient Acceptance, E,D, VU,DU,NR by IGNACIO at 11/6/2020 0822   Family Acceptance, E,D, VU,DU,NR by IGNACIO at 11/6/2020 0822                   Point: Body mechanics (Done)     Description:   Instruct learner(s) on proper positioning and spine alignment during self-care, functional mobility activities and/or exercises.              Learning Progress Summary           Patient Acceptance, E,D, VU,DU,NR by IGNACIO at 11/6/2020 0822   Family Acceptance, E,D, VU,DU,NR by IGNACIO at 11/6/2020 0822                               User Key     Initials Effective Dates Name Provider Type Discipline     01/29/20 -  Livia Piña OT Occupational Therapist OT              OT Recommendation and Plan  Therapy Frequency (OT): daily  Plan of Care Review  Plan of Care Reviewed With: patient, spouse  Progress: improving  Outcome Summary: OT IE completed post chart review. Pt presents with mild decrease in occupational I in ADLs, related t/f compared to PLOF. Pt grossly limited by pain, decreased fxl endurance and limited distal reach with decreased competency on spinal prec. OT educated pt on prec and log roll tech with improved understanding throughout. Pt completed sit < > Stand at EOB with SBA, CGA toilet t/f, gross SBA with fxl mobility with no AD, gaita belt support. Pt completed toileting with spv, UBD with CGA (posterior mgmt), hand hygiene with I and LBD with progression from max A d/d socks to SBA-CGA with AE. OT educated pt on each AE and pt initiated return demo.Pt could benefit from one more session for AE application to ADLs, however spouse present and cognizant on AE. Recommend home with assistance at d/c when medically ready.     Time Calculation:   Time Calculation- OT     Row Name 11/06/20 0938             Time Calculation- OT    OT Start Time  0822 -JY      OT Received On  11/06/20 -JY      OT Goal Re-Cert Due Date  11/16/20 -JY         Timed Charges    30587 - OT Self Care/Mgmt Minutes  21   -IGNACIO        User Key  (r) = Recorded By, (t) = Taken By, (c) = Cosigned By    Initials Name Provider Type    Livia Rhodes OT Occupational Therapist        Therapy Charges for Today     Code Description Service Date Service Provider Modifiers Qty    [id number]  OT SELF CARE/MGMT/TRAIN EA 15 MIN 11/6/2020 Livia Piña, OT GO 1    [id number]  OT EVAL LOW COMPLEXITY 4 11/6/2020 Livia Piña OT GO 1               Livia Piña OT  11/6/2020

## 2020-11-06 NOTE — PLAN OF CARE
Problem: Adult Inpatient Plan of Care  Goal: Plan of Care Review  Recent Flowsheet Documentation  Taken 11/6/2020 1050 by Adwoa Deluna, PT  Plan of Care Reviewed With:   patient   spouse  Outcome Summary: PT assessment noted impulsive mobility with limited understanding of safety concerns. Entered room with chair alarm activated as spouse and pt attempting to go to bathroom. Discussed need to call out for staff assistance and importance of fall precautions. Patient able to ambulated with CGA needing verbal cues for upright posture which he was able to maintain with minimal correction. Appropriate to continue therapy to help promote safe mobility education and HEP. RN aware of alarm situation after treatment.

## 2020-11-06 NOTE — DISCHARGE PLACEMENT REQUEST
"Adiel Lopez (74 y.o. Male)   Pt may dc today.  See outpt PT order.  Thank you  Inez Graf RN  [phone]  [address] [address]  Phone:  [phone]  Fax:          Patient:     Adiel Lopez MRN:  [id number]   [address]  [facility] [address] :  1945  SSN:    Phone: [phone] Sex:  M      INSURANCE PAYOR PLAN GROUP # SUBSCRIBER ID   Primary:  Secondary:    MEDICARE  [employer] MC SUP [id number]  [id number]      [id number]  [id number]   Admitting Diagnosis: Back pain [M54.9]  Order Date:  2020         Case Management  Consult       (Order ID: [id number])     Diagnosis:         Priority:  Routine Expected Date:   Expiration Date:        Interval:   Count:    Is discharge planning needed? If yes, who is requesting discharge planning? Provider  Reason for Consult? Out patient PT     Specimen Type:   Specimen Source:   Specimen Taken Date:   Specimen Taken Time:                   Verbal Order Mode: Verbal with readback   Authorizing Provider: Wojciech Kathleen MD  Authorizing Provider's NPI: [id number]     Order Entered By: Inez Graf RN 2020  2:04 PM     Electronically signed by:                Date of Birth Social Security Number Address Home Phone MRN    1945  [address]  [facility] [address] [phone] [id number]    Rastafarian Marital Status          None        Admission Date Admission Type Admitting Provider Attending Provider Department, Room/Bed    20 Elective Wojciech Kathleen MD Vaughan, John J, MD Psychiatric 3G, S359/1    Discharge Date Discharge Disposition Discharge Destination                       Attending Provider: Wojciech Kathleen MD    Allergies: Penicillins    Isolation: None   Infection: None   Code Status: CPR    Ht: 172.7 cm (68\")   Wt: 81.2 kg (179 lb)    Admission Cmt: None   Principal Problem: Spinal stenosis of lumbar region, status post fusion " [M48.061]                 Active Insurance as of 2020     Primary Coverage     Payor Plan Insurance Group Employer/Plan Group    MEDICARE MEDICARE A & B      Payor Plan Address Payor Plan Phone Number Payor Plan Fax Number Effective Dates    [address] [phone]  2011 - None Entered    [facility] [id number]       Subscriber Name Subscriber Birth Date Member ID       DANNY LOPEZ 1945 [id number]           Secondary Coverage     Payor Plan Insurance Group Employer/Plan Group    [employer]  SUP AAR HEALTH CARE OPTIONS      Payor Plan Address Payor Plan Phone Number Payor Plan Fax Number Effective Dates    [facility] [phone]  2018 - None Entered    [address]       [facility] [id number]       Subscriber Name Subscriber Birth Date Member ID       DANNY LOPEZ 1945 [id number]                 Emergency Contacts      (Rel.) Home Phone Work Phone Mobile Phone    Cyn Lopez (Spouse) -- -- [phone]    Rocio Hedrick (Daughter) -- -- [phone]               History & Physical      Jelly Graham MD at 20 1506          Patient Name: Danny Lopez  MRN: [id number]  : 1945  DOS: 2020    Attending: Wojciech Kathleen MD    Primary Care Provider: Mamadou Mckinney MD      Chief complaint:  Spinal stenosis, back pain, numbness.    Subjective   Patient is a pleasant 74 y.o. male presented for scheduled surgery by .    Patient has had previous back surgeries.  For several months he has had pain in his lower back with radiation down his right lower extremity.  This has interfered with his daily activities and he has not been able to walk due to pain.  He was diagnosed with severe spinal stenosis at L4-L5 as well as radiculopathy L4-L5.  Today he underwent the following procedures under general anesthesia:  1.  Decompressive laminectomy, bilateral medial facetectomy and bilateral foraminotomies, L4-L5, to decompress neural elements.  2.   "Transforaminal lumbar interbody fusion L4-L5 with interbody fusion cage and bone graft.  3.  Segmental instrumentation L4-L5 with DePuy transpedicular fixation.   4.  Posterolateral fusion of L4-L5 with bone graft.   5.  Harvesting of bone graft locally from spinous processes and lamina.   He tolerated surgery well and was admitted for further management.  Seen in his room postoperatively, doing fairly well, good pain control.  No   vomiting.  Some nausea on getting up.    Significant improvement in preoperative pain with improved sensation in the right leg and improved pain.    Allergies   Allergen Reactions   • Penicillins Rash     Allergic to \"cillin\" family.       Meds:  Medications Prior to Admission   Medication Sig Dispense Refill Last Dose   • amLODIPine (NORVASC) 5 MG tablet Take 5 mg by mouth Daily.   11/5/2020 at 0430   • lisinopril (PRINIVIL,ZESTRIL) 20 MG tablet Take 20 mg by mouth Daily.   11/4/2020 at 0700   • omeprazole (priLOSEC) 20 MG capsule Take 20 mg by mouth Daily.   11/5/2020 at 0430   • traZODone (DESYREL) 50 MG tablet Take 50 mg by mouth Every Night.   11/4/2020 at 2300         History:   Past Medical History:   Diagnosis Date   • Arthritis    • Back pain    • Cancer (CMS/HCC)     basal cell skin, ear   • GERD (gastroesophageal reflux disease)    • Hypertension    • PONV (postoperative nausea and vomiting)    • Wears dentures     upper   • Wears glasses      Past Surgical History:   Procedure Laterality Date   • APPENDECTOMY  1959   • BACK SURGERY  2010   • CATARACT EXTRACTION, BILATERAL  2005   • COLONOSCOPY  2018   • LUMBAR DISCECTOMY N/A 4/27/2018    Procedure: LUMBAR DISCECTOMY REVISION L4-5;  Surgeon: Wojciech Kathleen MD;  Location[facility];  Service: Orthopedic Spine   • SKIN BIOPSY  2017    basal cell     History reviewed. No pertinent family history.  Social History     Tobacco Use   • Smoking status: Never Smoker   • Smokeless tobacco: Never Used   Substance Use Topics   • Alcohol " "use: No   • Drug use: No   .  Retired from [employer].  Has 1 daughter.    Review of Systems  Pertinent items are noted in HPI, all other systems reviewed and negative    Vital Signs  /70 (BP Location: Right arm, Patient Position: Lying)   Pulse 89   Temp 98.4 °F (36.9 °C) (Oral)   Resp 20   Ht 172.7 cm (68\")   Wt 81.2 kg (179 lb)   SpO2 100%   BMI 27.22 kg/m²     Physical Exam:    General Appearance:    Alert, cooperative, in no acute distress   Head:    Normocephalic, without obvious abnormality, atraumatic   Eyes:            Lids and lashes normal, conjunctivae and sclerae normal, no   icterus, no pallor, corneas clear,    Ears:    Ears appear intact with no abnormalities noted   Throat:   No oral lesions, no thrush, oral mucosa moist   Neck:   No adenopathy, supple, trachea midline, no thyromegaly   Back: Clean dry and intact dressing, drain to Hemovac present.        Lungs:     Clear to auscultation,respirations regular, even and                   unlabored    Heart:    Regular rhythm and normal rate, normal S1 and S2, no            murmur, no gallop   Abdomen:     Normal bowel sounds, no masses, no organomegaly, soft        non-tender, non-distended, no guarding, no rebound                 tenderness   Genitalia:    Deferred   Extremities:   Moves all extremities well, no edema, no cyanosis, no              redness   Pulses:   Pulses palpable and equal bilaterally   Skin:   No bleeding, bruising or rash   Neurologic:   Cranial nerves 2 - 12 grossly intact, intact flexion dorsiflexion bilateral feet.      I reviewed the patient's new clinical results.       Results from last 7 days   Lab Units 11/03/20  1336   WBC 10*3/mm3 11.28*   HEMOGLOBIN g/dL 14.8   HEMATOCRIT % 44.8   PLATELETS 10*3/mm3 358     Results from last 7 days   Lab Units 11/03/20  1336   POTASSIUM mmol/L 4.5     Lab Results   Component Value Date    HGBA1C 5.70 (H) 11/03/2020       Assessment and Plan:       Spinal stenosis of " lumbar region, status post fusion    Back pain    Essential hypertension    GERD without esophagitis  Borderline elevation hemoglobin A1c    Plan  1. PT/OT.  Ambulate.  2. Pain control-prns   3. IS-encourage  4. DVT proph- mechanicals.  5. Bowel regimen  6. Resume home medications as appropriate  7. Monitor post-op labs  8. DC planning for home.    - Hypertension:  Resume home medications as appropriate, formulary substitution when indicated.  Holding parameters.  Prn medications for elevated blood pressure.    -Explained to patient implication of A1C elevation, need to modify diet and increase activity, and f/u with PCP.         Dragon disclaimer:  Part of this encounter note is an electronic transcription/translation of spoken language to printed text. The electronic translation of spoken language may permit erroneous, or at times, nonsensical words or phrases to be inadvertently transcribed; Although I have reviewed the note for such errors, some may still exist.    Jelly Graham MD  11/05/20  15:06 EST            Electronically signed by Jelly Graham MD at 11/05/20 7314     Petey Chaves PA-C at 11/05/20 0658     Attestation signed by Wojciech Kathleen MD at 11/05/20 1050    reviewd                  Pre-Op H&P  Adiel WILLIAM [place]  [id number]  1945    Chief complaint: low back pain/numbness    HPI:    Patient is a 74 y.o.male who presents with a history of pain and numbness in the lower back. The patient states that the pain sometimes radiates down his right lower extremity. He has failed to adequately respond to conservative treatment and presents to the operating room today for surgical management.      Review of Systems:  General ROS: negative for chills, fever or skin lesions;  No changes since last office visit.  Neg for recent sick exposure  Cardiovascular ROS: no chest pain or dyspnea on exertion  Respiratory ROS: no cough, shortness of breath, or wheezing    Allergies:   Allergies  "  Allergen Reactions   • Penicillins Rash     Allergic to \"cillin\" family.       Home Meds:    No current facility-administered medications on file prior to encounter.      Current Outpatient Medications on File Prior to Encounter   Medication Sig Dispense Refill   • lisinopril (PRINIVIL,ZESTRIL) 20 MG tablet Take 20 mg by mouth Daily.     • omeprazole (priLOSEC) 20 MG capsule Take 20 mg by mouth Daily.         PMH:   Past Medical History:   Diagnosis Date   • Arthritis    • Back pain    • Cancer (CMS/HCC)     basal cell skin, ear   • GERD (gastroesophageal reflux disease)    • Hypertension    • PONV (postoperative nausea and vomiting)    • Wears dentures     upper   • Wears glasses      PSH:    Past Surgical History:   Procedure Laterality Date   • APPENDECTOMY  1959   • BACK SURGERY  2010   • CATARACT EXTRACTION, BILATERAL  2005   • COLONOSCOPY  2018   • LUMBAR DISCECTOMY N/A 4/27/2018    Procedure: LUMBAR DISCECTOMY REVISION L4-5;  Surgeon: Wojciech Kathleen MD;  Location: [facility];  Service: Orthopedic Spine   • SKIN BIOPSY  2017    basal cell       Immunization History:  Influenza: 2020  Pneumococcal: denies  Tetanus: patient unsure of date     Social History:   Tobacco:   Social History     Tobacco Use   Smoking Status Never Smoker   Smokeless Tobacco Never Used      Alcohol:     Social History     Substance and Sexual Activity   Alcohol Use No       Vitals:           /84 (BP Location: Right arm, Patient Position: Lying)   Pulse 91   Temp 97.8 °F (36.6 °C) (Temporal)   Resp 16   Ht 172.7 cm (68\")   Wt 81.2 kg (179 lb)   SpO2 99%   BMI 27.22 kg/m²     Physical Exam:  General Appearance:    Alert, cooperative, no distress, appears stated age   Head:    Normocephalic, without obvious abnormality, atraumatic   Lungs:     Clear to auscultation bilaterally, respirations unlabored    Heart:   Regular rate and rhythm, S1 and S2 normal, no murmur, rub    or gallop    Abdomen:    Soft, nontender.  +bowel " "sounds   Breast Exam:    deferred   Genitalia:    deferred   Extremities:   Extremities normal, atraumatic, no cyanosis or edema   Skin:   Skin color, texture, turgor normal, no rashes or lesions   Neurologic:   Grossly intact   Results Review  LABS:  Lab Results   Component Value Date    WBC 11.28 (H) 2020    HGB 14.8 2020    HCT 44.8 2020    MCV 96.6 2020     2020    K 4.5 2020       RADIOLOGY:  Imaging Results (Last 72 Hours)     ** No results found for the last 72 hours. **          Cancer Staging (if applicable)  Cancer Patient: __ yes _x_no __unknown; If yes, clinical stage T:__ N:__M:__, stage group or __N/A    Impression: Recurrent lumbar disc herniation    Plan: Lumbar spinal fusion    Petey Chaves PA-C   2020   06:58 EST    Electronically signed by Wojciech Kathleen MD at 20 1050       Operative/Procedure Notes (last 24 hours) (Notes from 20 1407 through 20 140)    No notes of this type exist for this encounter.            Physician Progress Notes (last 24 hours) (Notes from 20 1407 through 20 140)      Jelly Graham MD at 20 1241          IM progress note      Adiel NATALIIA [place]  [id number]  1945     LOS: 1 day     Attending: Wojciech Kathleen MD    Primary Care Provider: Mamadou Mckinney MD      Chief Complaint/Reason for visit:  No chief complaint on file.      Subjective   Doing well.  Some incisional pain.  Ambulated, tolerated p.o. diet, voided.  No nausea, vomiting, or shortness of breath.    Objective        Visit Vitals  /78   Pulse 81   Temp 98.2 °F (36.8 °C) (Oral)   Resp 16   Ht 172.7 cm (68\")   Wt 81.2 kg (179 lb)   SpO2 96%   BMI 27.22 kg/m²     Temp (24hrs), Av.5 °F (36.9 °C), Min:98.1 °F (36.7 °C), Max:98.9 °F (37.2 °C)      Intake/Output:    Intake/Output Summary (Last 24 hours) at 2020 1241  Last data filed at 2020 0900  Gross per 24 hour   Intake 600 ml   Output 2415 ml   Net " -1815 ml        Physical Therapy:Taken 11/6/2020 1050 by Adwoa Deluna, PT  Plan of Care Reviewed With:  • patient  • spouse  Outcome Summary: PT assessment noted impulsive mobility with limited understanding of safety concerns. Entered room with chair alarm activated as spouse and pt attempting to go to bathroom. Discussed need to call out for staff assistance and importance of fall precautions. Patient able to ambulated with CGA needing verbal cues for upright posture which he was able to maintain with minimal correction. Appropriate to continue therapy to help promote safe mobility education and HEP. RN aware of alarm situation after treatment.    Physical Exam:     General Appearance:    Alert, cooperative, in no acute distress   Head:    Normocephalic, without obvious abnormality, atraumatic    Lungs:     Normal effort, symmetric chest rise,  clear to      auscultation bilaterally              Heart:    Regular rhythm and normal rate, normal S1 and S2    Abdomen:     Normal bowel sounds, no masses, no organomegaly, soft        non-tender, non-distended, no guarding, no rebound                Tenderness  Back: Clean dry and intact dressing.  Drain to Hemovac present.   Extremities:   No clubbing, cyanosis or edema.  No deformities.    Pulses:   Pulses palpable and equal bilaterally   Skin:   No bleeding, bruising or rash  Neuro: No gross motor deficit.  Intact flexion dorsiflexion bilateral feet.          Results Review:     I reviewed the patient's new clinical results.   Results from last 7 days   Lab Units 11/06/20  0715 11/03/20  1336   WBC 10*3/mm3 17.06* 11.28*   HEMOGLOBIN g/dL 12.9* 14.8   HEMATOCRIT % 39.5 44.8   PLATELETS 10*3/mm3 328 358     Results from last 7 days   Lab Units 11/06/20  0715 11/03/20  1336   SODIUM mmol/L 141  --    POTASSIUM mmol/L 4.7 4.5   CHLORIDE mmol/L 105  --    CO2 mmol/L 25.0  --    BUN mg/dL 14  --    CREATININE mg/dL 0.86  --    CALCIUM mg/dL 9.2  --    GLUCOSE mg/dL 121*   --      I reviewed the patient's new imaging including images and reports.    All medications reviewed.   amLODIPine, 5 mg, Oral, Daily  docusate sodium, 100 mg, Oral, BID  famotidine, 20 mg, Intravenous, Q12H    Or  famotidine, 20 mg, Oral, Q12H  lisinopril, 20 mg, Oral, Daily  pantoprazole, 40 mg, Oral, Q AM  polyethylene glycol, 17 g, Oral, Daily  sodium chloride, 3 mL, Intravenous, Q12H  traZODone, 50 mg, Oral, Nightly      acetaminophen, 650 mg, Q4H PRN  cyclobenzaprine, 10 mg, TID PRN  HYDROmorphone, 1 mg, Q4H PRN    And  naloxone, 0.4 mg, Q5 Min PRN  labetalol, 10 mg, Q4H PRN  Morphine, 1 mg, Q4H PRN    And  naloxone, 0.4 mg, Q5 Min PRN  ondansetron, 4 mg, Q6H PRN  oxyCODONE-acetaminophen, 1 tablet, Q4H PRN  sodium chloride, 500 mL, TID PRN  sodium chloride, 10 mL, PRN        Assessment/Plan       Spinal stenosis of lumbar region, status post fusion    Back pain    Essential hypertension    GERD without esophagitis         Plan  1. PT/OT.  Ambulate.  2. Pain control-prns       3. IS-encourage  4. DVT proph- mechanicals.  5. Bowel regimen  6. Resume home medications as appropriate  7. Monitor post-op labs  8. DC planning for home.  Likely tomorrow 11/7/2020     - Hypertension:  Resume home medications as appropriate, formulary substitution when indicated.  Holding parameters.  Prn medications for elevated blood pressure.     -Explained to patient implication of A1C elevation, need to modify diet and increase activity, and f/u with PCP.    Jelly Graham MD  11/06/20  12:41 EST      Electronically signed by Jelly Graham MD at 11/06/20 1241       Consult Notes (last 24 hours) (Notes from 11/05/20 1407 through 11/06/20 1407)    No notes of this type exist for this encounter.            Physical Therapy Notes (last 24 hours) (Notes from 11/05/20 1407 through 11/06/20 1407)      Adwoa Deluna, PT at 11/06/20 1015  Version 1 of 1         Problem: Adult Inpatient Plan of Care  Goal: Plan of Care  Review  Recent Flowsheet Documentation  Taken 2020 1050 by Adwoa Deluna PT  Plan of Care Reviewed With:  • patient  • spouse  Outcome Summary: PT assessment noted impulsive mobility with limited understanding of safety concerns. Entered room with chair alarm activated as spouse and pt attempting to go to bathroom. Discussed need to call out for staff assistance and importance of fall precautions. Patient able to ambulated with CGA needing verbal cues for upright posture which he was able to maintain with minimal correction. Appropriate to continue therapy to help promote safe mobility education and HEP. RN aware of alarm situation after treatment.       Electronically signed by Adwoa Deluna PT at 20 1058     Adwoa Deluna PT at 20 1015  Version 1 of 1         Patient Name: Adiel Lopez  : 1945    MRN: [id number]                              Today's Date: 2020       Admit Date: 2020    Visit Dx: No diagnosis found.  Patient Active Problem List   Diagnosis   • Back pain   • Essential hypertension   • GERD without esophagitis   • Spinal stenosis of lumbar region, status post fusion     Past Medical History:   Diagnosis Date   • Arthritis    • Back pain    • Cancer (CMS/HCC)     basal cell skin, ear   • GERD (gastroesophageal reflux disease)    • Hypertension    • PONV (postoperative nausea and vomiting)    • Wears dentures     upper   • Wears glasses      Past Surgical History:   Procedure Laterality Date   • APPENDECTOMY     • BACK SURGERY     • CATARACT EXTRACTION, BILATERAL     • COLONOSCOPY     • LUMBAR DISCECTOMY N/A 2018    Procedure: LUMBAR DISCECTOMY REVISION L4-5;  Surgeon: Wojciech Kathleen MD;  Location:  [email or web address];  Service: Orthopedic Spine   • LUMBAR DISCECTOMY FUSION INSTRUMENTATION N/A 2020    Procedure: LUMBAR SPINAL FUSION L4-5;  Surgeon: Wojciceh Kathleen MD;  Location:  [email or web address];  Service: Orthopedic Spine;  Laterality: N/A;   •  SKIN BIOPSY  2017    basal cell     General Information     Row Name 11/06/20 1044          Physical Therapy Time and Intention    Document Type  evaluation  -ES     Mode of Treatment  individual therapy;physical therapy  -ES     Row Name 11/06/20 1044          General Information    Patient Profile Reviewed  yes  -ES     Prior Level of Function  independent:;all household mobility;community mobility  -ES     Existing Precautions/Restrictions  fall;spinal  -ES     Barriers to Rehab  previous functional deficit  -ES     Row Name 11/06/20 1044          Living Environment    Lives With  spouse  -ES     Row Name 11/06/20 1044          Home Main Entrance    Number of Stairs, Main Entrance  none  -ES     Stair Railings, Main Entrance  none  -ES     Row Name 11/06/20 1044          Stairs Within Home, Primary    Number of Stairs, Within Home, Primary  none  -ES     Stair Railings, Within Home, Primary  none  -ES     Row Name 11/06/20 1044          Cognition    Orientation Status (Cognition)  oriented x 4  -ES     Row Name 11/06/20 1044          Safety Issues, Functional Mobility    Safety Issues Affecting Function (Mobility)  ability to follow commands;at risk behavior observed;awareness of need for assistance;impulsivity;insight into deficits/self-awareness  -ES     Impairments Affecting Function (Mobility)  pain;endurance/activity tolerance  -ES       User Key  (r) = Recorded By, (t) = Taken By, (c) = Cosigned By    Initials Name Provider Type    ES Adwoa Deluna, PT Physical Therapist        Mobility     Row Name 11/06/20 1045          Sit-Stand Transfer    Sit-Stand [place] (Transfers)  standby assist;verbal cues Patient in process of standing when PT arrived with wife support. Exit alarm engaged which PT turned off and educated patient on need to call out for staff assistance.  -ES     Row Name 11/06/20 1045          Gait/Stairs (Locomotion)    [place] Level (Gait)  contact guard  -ES     Assistive Device  (Gait)  walker, front-wheeled  -ES     Distance in Feet (Gait)  200  -ES     Deviations/Abnormal Patterns (Gait)  bilateral deviations;antalgic;festinating/shuffling  -ES     Bilateral Gait Deviations  forward flexed posture  -ES       User Key  (r) = Recorded By, (t) = Taken By, (c) = Cosigned By    Initials Name Provider Type    ES Adwoa Deluna, PT Physical Therapist        Obj/Interventions     Row Name 11/06/20 1047          Strength Comprehensive (MMT)    General Manual Muscle Testing (MMT) Assessment  no strength deficits identified  -ES     Comment, General Manual Muscle Testing (MMT) Assessment  LE strength grossly 4+/5 without formal testing, no buckling at knees though patient indicates shuffled gait is baseline.  -ES     Row Name 11/06/20 1047          Motor Skills    Therapeutic Exercise  hip;knee  -ES     Row Name 11/06/20 1047          Hip (Therapeutic Exercise)    Hip (Therapeutic Exercise)  AROM (active range of motion)  -ES     Hip AROM (Therapeutic Exercise)  bilateral;flexion;sitting;10 repetitions  -ES     Row Name 11/06/20 1047          Knee (Therapeutic Exercise)    Knee (Therapeutic Exercise)  AROM (active range of motion)  -ES     Knee AROM (Therapeutic Exercise)  bilateral;LAQ (long arc quad);sitting  -ES     Row Name 11/06/20 1047          Balance    Dynamic Standing Balance  WFL;supported;standing  -ES     Balance Interventions  standing;dynamic reaching  -ES     Comment, Balance  Patient able to brush teeth and wash hands at sink without noted balance issues  -ES     Row Name 11/06/20 1047          Sensory Assessment (Somatosensory)    Sensory Assessment (Somatosensory)  LE sensation intact  -ES       User Key  (r) = Recorded By, (t) = Taken By, (c) = Cosigned By    Initials Name Provider Type    ES Adwoa Deluna, PT Physical Therapist        Goals/Plan     Row Name 11/06/20 1054          Bed Mobility Goal 1 (PT)    Activity/Assistive Device (Bed Mobility Goal 1, PT)  bed mobility  activities, all  -ES     [place] Level/Cues Needed (Bed Mobility Goal 1, PT)  independent  -ES     Time Frame (Bed Mobility Goal 1, PT)  long term goal (LTG);3 days  -ES     Row Name 11/06/20 1054          Transfer Goal 1 (PT)    Activity/Assistive Device (Transfer Goal 1, PT)  sit-to-stand/stand-to-sit  -ES     [place] Level/Cues Needed (Transfer Goal 1, PT)  supervision required  -ES     Time Frame (Transfer Goal 1, PT)  long term goal (LTG);2 days  -ES     Row Name 11/06/20 1054          Gait Training Goal 1 (PT)    Activity/Assistive Device (Gait Training Goal 1, PT)  gait (walking locomotion);decrease fall risk;walker, rolling  -ES     [place] Level (Gait Training Goal 1, PT)  supervision required  -ES     Distance (Gait Training Goal 1, PT)  350  -ES     Time Frame (Gait Training Goal 1, PT)  long term goal (LTG);3 days  -ES     Row Name 11/06/20 1054          Patient Education Goal (PT)    Activity (Patient Education Goal, PT)  Verbalize HEP without cues and mobility safety.  -ES     Time Frame (Patient Education Goal, PT)  long term goal (LTG);3 days  -ES       User Key  (r) = Recorded By, (t) = Taken By, (c) = Cosigned By    Initials Name Provider Type    Adwoa Chappell, PT Physical Therapist        Clinical Impression     Row Name 11/06/20 1050          Pain Scale: Numbers Pre/Post-Treatment    Pretreatment Pain Rating  6/10  -ES     Posttreatment Pain Rating  6/10  -ES     Pain Location - Orientation  incisional  -ES     Pain Location  back  -ES     Pain Intervention(s)  Repositioned;Cold applied  -ES     Row Name 11/06/20 1050          Plan of Care Review    Plan of Care Reviewed With  patient;spouse  -ES     Outcome Summary  PT assessment noted impulsive mobility with limited understanding of safety concerns. Entered room with chair alarm activated as spouse and pt attempting to go to bathroom. Discussed need to call out for staff assistance and importance of fall precautions. Patient  able to ambulated with CGA needing verbal cues for upright posture which he was able to maintain with minimal correction. Appropriate to continue therapy to help promote safe mobility education and HEP. RN aware of alarm situation after treatment.  -ES     Row Name 11/06/20 1050          Therapy Assessment/Plan (PT)    Criteria for Skilled Interventions Met (PT)  yes;meets criteria  -ES     Predicted Duration of Therapy Intervention (PT)  3txs  -ES     Row Name 11/06/20 1050          Positioning and Restraints    Pre-Treatment Position  sitting in chair/recliner in process of standing with wife's assistance, chair alarm activated.  -ES     Post Treatment Position  chair  -ES     In Chair  notified nsg;sitting;call light within reach;encouraged to call for assist;exit alarm on;with family/caregiver  -ES       User Key  (r) = Recorded By, (t) = Taken By, (c) = Cosigned By    Initials Name Provider Type    Adwoa Chappell PT Physical Therapist        Outcome Measures     Row Name 11/06/20 1059          How much help from another person do you currently need...    Turning from your back to your side while in flat bed without using bedrails?  3  -ES     Moving from lying on back to sitting on the side of a flat bed without bedrails?  3  -ES     Moving to and from a bed to a chair (including a wheelchair)?  3  -ES     Standing up from a chair using your arms (e.g., wheelchair, bedside chair)?  3  -ES     Climbing 3-5 steps with a railing?  3  -ES     To walk in hospital room?  3  -ES     AM-PAC 6 Clicks Score (PT)  18  -ES     Row Name 11/06/20 1059          Functional Assessment    Outcome Measure Options  AM-PAC 6 Clicks Basic Mobility (PT)  -ES       User Key  (r) = Recorded By, (t) = Taken By, (c) = Cosigned By    Initials Name Provider Type    Adwoa Chappell PT Physical Therapist        Physical Therapy Education                 Title: PT OT SLP Therapies (Done)     Topic: Physical Therapy (Done)     Point:  Mobility training (Done)     Learning Progress Summary           Patient Acceptance, E,TB, VU by ES at 11/6/2020 1015                   Point: Home exercise program (Done)     Learning Progress Summary           Patient Acceptance, E,TB, VU by ES at 11/6/2020 1015                   Point: Body mechanics (Done)     Learning Progress Summary           Patient Acceptance, E,TB, VU by ES at 11/6/2020 1015                   Point: Precautions (Done)     Learning Progress Summary           Patient Acceptance, E,TB, VU by ES at 11/6/2020 1015                               User Key     Initials Effective Dates Name Provider Type Discipline     08/02/16 -  Adwoa Deluna, PT Physical Therapist PT              PT Recommendation and Plan  Planned Therapy Interventions (PT): balance training  Plan of Care Reviewed With: patient, spouse  Outcome Summary: PT assessment noted impulsive mobility with limited understanding of safety concerns. Entered room with chair alarm activated as spouse and pt attempting to go to bathroom. Discussed need to call out for staff assistance and importance of fall precautions. Patient able to ambulated with CGA needing verbal cues for upright posture which he was able to maintain with minimal correction. Appropriate to continue therapy to help promote safe mobility education and HEP. RN aware of alarm situation after treatment.     Time Calculation:   PT Charges     Row Name 11/06/20 1103             Time Calculation    Start Time  1015  -ES      PT Received On  11/06/20  -ES      PT Goal Re-Cert Due Date  11/16/20  -ES         Timed Charges    03771 - PT Therapeutic Exercise Minutes  10  -ES      62077 - Gait Training Minutes   15  -ES        User Key  (r) = Recorded By, (t) = Taken By, (c) = Cosigned By    Initials Name Provider Type    Adwoa Chappell PT Physical Therapist        Therapy Charges for Today     Code Description Service Date Service Provider Modifiers Qty    05359745523 HC GAIT  TRAINING EA 15 MIN 11/6/2020 Adwoa Deluna, PT GP 1    14488270704 HC PT EVAL MOD COMPLEXITY 2 11/6/2020 Adwoa Deluna, PT GP 1    37259636909 HC PT THER PROC EA 15 MIN 11/6/2020 Adwoa Deluna, PT GP 1          PT G-Codes  Outcome Measure Options: AM-PAC 6 Clicks Basic Mobility (PT)  AM-PAC 6 Clicks Score (PT): 18  AM-PAC 6 Clicks Score (OT): 20    Adwoa Deluna PT  11/6/2020      Electronically signed by Adwoa Deluna, PT at 11/06/20 1104       Discharge Summary    No notes of this type exist for this encounter.

## 2020-11-06 NOTE — THERAPY EVALUATION
Patient Name: Adiel Lopez  : 1945    MRN: [id number]                              Today's Date: 2020       Admit Date: 2020    Visit Dx: No diagnosis found.  Patient Active Problem List   Diagnosis   • Back pain   • Essential hypertension   • GERD without esophagitis   • Spinal stenosis of lumbar region, status post fusion     Past Medical History:   Diagnosis Date   • Arthritis    • Back pain    • Cancer (CMS/HCC)     basal cell skin, ear   • GERD (gastroesophageal reflux disease)    • Hypertension    • PONV (postoperative nausea and vomiting)    • Wears dentures     upper   • Wears glasses      Past Surgical History:   Procedure Laterality Date   • APPENDECTOMY     • BACK SURGERY     • CATARACT EXTRACTION, BILATERAL     • COLONOSCOPY     • LUMBAR DISCECTOMY N/A 2018    Procedure: LUMBAR DISCECTOMY REVISION L4-5;  Surgeon: Wojciech Kathleen MD;  Location:  [facility];  Service: Orthopedic Spine   • LUMBAR DISCECTOMY FUSION INSTRUMENTATION N/A 2020    Procedure: LUMBAR SPINAL FUSION L4-5;  Surgeon: Wojciech Kathleen MD;  Location:  [facility];  Service: Orthopedic Spine;  Laterality: N/A;   • SKIN BIOPSY  2017    basal cell     General Information     Row Name 20 1044          Physical Therapy Time and Intention    Document Type  evaluation  -ES     Mode of Treatment  individual therapy;physical therapy  -ES     Row Name 20 1044          General Information    Patient Profile Reviewed  yes  -ES     Prior Level of Function  independent:;all household mobility;community mobility  -ES     Existing Precautions/Restrictions  fall;spinal  -ES     Barriers to Rehab  previous functional deficit  -ES     Row Name 20 1044          Living Environment    Lives With  spouse  -ES     Row Name 20 1044          Home Main Entrance    Number of Stairs, Main Entrance  none  -ES     Stair Railings, Main Entrance  none  -ES     Row Name 20 1044          Stairs Within  Home, Primary    Number of Stairs, Within Home, Primary  none  -ES     Stair Railings, Within Home, Primary  none  -ES     Row Name 11/06/20 1044          Cognition    Orientation Status (Cognition)  oriented x 4  -ES     Row Name 11/06/20 1044          Safety Issues, Functional Mobility    Safety Issues Affecting Function (Mobility)  ability to follow commands;at risk behavior observed;awareness of need for assistance;impulsivity;insight into deficits/self-awareness  -ES     Impairments Affecting Function (Mobility)  pain;endurance/activity tolerance  -ES       User Key  (r) = Recorded By, (t) = Taken By, (c) = Cosigned By    Initials Name Provider Type    ES Adwoa Deluna, PT Physical Therapist        Mobility     Row Name 11/06/20 1045          Sit-Stand Transfer    Sit-Stand [place] (Transfers)  standby assist;verbal cues Patient in process of standing when PT arrived with wife support. Exit alarm engaged which PT turned off and educated patient on need to call out for staff assistance.  -ES     Row Name 11/06/20 1045          Gait/Stairs (Locomotion)    [place] Level (Gait)  contact guard  -ES     Assistive Device (Gait)  walker, front-wheeled  -ES     Distance in Feet (Gait)  200  -ES     Deviations/Abnormal Patterns (Gait)  bilateral deviations;antalgic;festinating/shuffling  -ES     Bilateral Gait Deviations  forward flexed posture  -ES       User Key  (r) = Recorded By, (t) = Taken By, (c) = Cosigned By    Initials Name Provider Type    ES Adwoa Deluna, PT Physical Therapist        Obj/Interventions     Row Name 11/06/20 1047          Strength Comprehensive (MMT)    General Manual Muscle Testing (MMT) Assessment  no strength deficits identified  -ES     Comment, General Manual Muscle Testing (MMT) Assessment  LE strength grossly 4+/5 without formal testing, no buckling at knees though patient indicates shuffled gait is baseline.  -ES     Row Name 11/06/20 1047          Motor Skills     Therapeutic Exercise  hip;knee  -ES     Row Name 11/06/20 1047          Hip (Therapeutic Exercise)    Hip (Therapeutic Exercise)  AROM (active range of motion)  -ES     Hip AROM (Therapeutic Exercise)  bilateral;flexion;sitting;10 repetitions  -ES     Row Name 11/06/20 1047          Knee (Therapeutic Exercise)    Knee (Therapeutic Exercise)  AROM (active range of motion)  -ES     Knee AROM (Therapeutic Exercise)  bilateral;LAQ (long arc quad);sitting  -ES     Row Name 11/06/20 1047          Balance    Dynamic Standing Balance  WFL;supported;standing  -ES     Balance Interventions  standing;dynamic reaching  -ES     Comment, Balance  Patient able to brush teeth and wash hands at sink without noted balance issues  -ES     Row Name 11/06/20 1047          Sensory Assessment (Somatosensory)    Sensory Assessment (Somatosensory)  LE sensation intact  -ES       User Key  (r) = Recorded By, (t) = Taken By, (c) = Cosigned By    Initials Name Provider Type    ES Adwoa Deluna, PT Physical Therapist        Goals/Plan     Row Name 11/06/20 1054          Bed Mobility Goal 1 (PT)    Activity/Assistive Device (Bed Mobility Goal 1, PT)  bed mobility activities, all  -ES     [place] Level/Cues Needed (Bed Mobility Goal 1, PT)  independent  -ES     Time Frame (Bed Mobility Goal 1, PT)  long term goal (LTG);3 days  -ES     Row Name 11/06/20 1054          Transfer Goal 1 (PT)    Activity/Assistive Device (Transfer Goal 1, PT)  sit-to-stand/stand-to-sit  -ES     [place] Level/Cues Needed (Transfer Goal 1, PT)  supervision required  -ES     Time Frame (Transfer Goal 1, PT)  long term goal (LTG);2 days  -ES     Row Name 11/06/20 1054          Gait Training Goal 1 (PT)    Activity/Assistive Device (Gait Training Goal 1, PT)  gait (walking locomotion);decrease fall risk;walker, rolling  -ES     [place] Level (Gait Training Goal 1, PT)  supervision required  -ES     Distance (Gait Training Goal 1, PT)  350  -ES     Time Frame  (Gait Training Goal 1, PT)  long term goal (LTG);3 days  -ES     Row Name 11/06/20 1054          Patient Education Goal (PT)    Activity (Patient Education Goal, PT)  Verbalize HEP without cues and mobility safety.  -ES     Time Frame (Patient Education Goal, PT)  long term goal (LTG);3 days  -ES       User Key  (r) = Recorded By, (t) = Taken By, (c) = Cosigned By    Initials Name Provider Type    ES Adwoa Deluna, PT Physical Therapist        Clinical Impression     Row Name 11/06/20 1050          Pain Scale: Numbers Pre/Post-Treatment    Pretreatment Pain Rating  6/10  -ES     Posttreatment Pain Rating  6/10  -ES     Pain Location - Orientation  incisional  -ES     Pain Location  back  -ES     Pain Intervention(s)  Repositioned;Cold applied  -ES     Row Name 11/06/20 1050          Plan of Care Review    Plan of Care Reviewed With  patient;spouse  -ES     Outcome Summary  PT assessment noted impulsive mobility with limited understanding of safety concerns. Entered room with chair alarm activated as spouse and pt attempting to go to bathroom. Discussed need to call out for staff assistance and importance of fall precautions. Patient able to ambulated with CGA needing verbal cues for upright posture which he was able to maintain with minimal correction. Appropriate to continue therapy to help promote safe mobility education and HEP. RN aware of alarm situation after treatment.  -ES     Row Name 11/06/20 1050          Therapy Assessment/Plan (PT)    Criteria for Skilled Interventions Met (PT)  yes;meets criteria  -ES     Predicted Duration of Therapy Intervention (PT)  3txs  -ES     Row Name 11/06/20 1050          Positioning and Restraints    Pre-Treatment Position  sitting in chair/recliner in process of standing with wife's assistance, chair alarm activated.  -ES     Post Treatment Position  chair  -ES     In Chair  notified nsg;sitting;call light within reach;encouraged to call for assist;exit alarm on;with  family/caregiver  -ES       User Key  (r) = Recorded By, (t) = Taken By, (c) = Cosigned By    Initials Name Provider Type    Adwoa Chappell, PT Physical Therapist        Outcome Measures     Row Name 11/06/20 1059          How much help from another person do you currently need...    Turning from your back to your side while in flat bed without using bedrails?  3  -ES     Moving from lying on back to sitting on the side of a flat bed without bedrails?  3  -ES     Moving to and from a bed to a chair (including a wheelchair)?  3  -ES     Standing up from a chair using your arms (e.g., wheelchair, bedside chair)?  3  -ES     Climbing 3-5 steps with a railing?  3  -ES     To walk in hospital room?  3  -ES     AM-PAC 6 Clicks Score (PT)  18  -ES     Row Name 11/06/20 1059          Functional Assessment    Outcome Measure Options  AM-PAC 6 Clicks Basic Mobility (PT)  -ES       User Key  (r) = Recorded By, (t) = Taken By, (c) = Cosigned By    Initials Name Provider Type    ES Adwoa Deluna, PT Physical Therapist        Physical Therapy Education                 Title: PT OT SLP Therapies (Done)     Topic: Physical Therapy (Done)     Point: Mobility training (Done)     Learning Progress Summary           Patient Acceptance, E,TB, VU by ES at 11/6/2020 1015                   Point: Home exercise program (Done)     Learning Progress Summary           Patient Acceptance, E,TB, VU by ES at 11/6/2020 1015                   Point: Body mechanics (Done)     Learning Progress Summary           Patient Acceptance, E,TB, VU by ES at 11/6/2020 1015                   Point: Precautions (Done)     Learning Progress Summary           Patient Acceptance, E,TB, VU by ES at 11/6/2020 1015                               User Key     Initials Effective Dates Name Provider Type Discipline     08/02/16 -  Adwoa Deluna, PT Physical Therapist PT              PT Recommendation and Plan  Planned Therapy Interventions (PT): balance  training  Plan of Care Reviewed With: patient, spouse  Outcome Summary: PT assessment noted impulsive mobility with limited understanding of safety concerns. Entered room with chair alarm activated as spouse and pt attempting to go to bathroom. Discussed need to call out for staff assistance and importance of fall precautions. Patient able to ambulated with CGA needing verbal cues for upright posture which he was able to maintain with minimal correction. Appropriate to continue therapy to help promote safe mobility education and HEP. RN aware of alarm situation after treatment.     Time Calculation:   PT Charges     Row Name 11/06/20 1103             Time Calculation    Start Time  1015  -ES      PT Received On  11/06/20  -ES      PT Goal Re-Cert Due Date  11/16/20  -ES         Timed Charges    85995 - PT Therapeutic Exercise Minutes  10  -ES      18886 - Gait Training Minutes   15  -ES        User Key  (r) = Recorded By, (t) = Taken By, (c) = Cosigned By    Initials Name Provider Type    Adwoa Chappell, PT Physical Therapist        Therapy Charges for Today     Code Description Service Date Service Provider Modifiers Qty    12483355343 HC GAIT TRAINING EA 15 MIN 11/6/2020 Adwoa Deluna, PT GP 1    27922684049 HC PT EVAL MOD COMPLEXITY 2 11/6/2020 Adwoa Deluna, PT GP 1    97945299253 HC PT THER PROC EA 15 MIN 11/6/2020 Adwoa Deluna, PT GP 1          PT G-Codes  Outcome Measure Options: AM-PAC 6 Clicks Basic Mobility (PT)  AM-PAC 6 Clicks Score (PT): 18  AM-PAC 6 Clicks Score (OT): 20    Adwoa Deluna PT  11/6/2020

## 2020-11-06 NOTE — DISCHARGE PLACEMENT REQUEST
"Adiel Lopez (74 y.o. Male)   Room 3G 359  DC later today  Thank you  Inez Graf RN  [phone]  [facility] 3G  [address] [facility] [address]  Dept. Phone:  [phone]  Dept. Fax:   Date Ordered: 2020         Patient:  Adiel Lopez MRN:  [id number]   [address] [address] :  1945  SSN:    Phone: [phone] Sex:  M     Weight: 81.2 kg (179 lb)         Ht Readings from Last 1 Encounters:   20 172.7 cm (68\")         Walker               (Order ID: [id number])    Diagnosis:  Spinal stenosis of lumbar region, unspecified whether neurogenic claudication present (M48.061 [ICD-10-CM] 724.02 [ICD-9-CM])   Quantity:  1     Equipment:  Walker Folding with Wheels  Length of Need (99 Months = Lifetime): 99 Months = Lifetime        Authorizing Provider's Phone: [phone]   Verbal Order Mode: Per protocol: no cosign required  Authorizing Provider:Jelly Graham MD  Authorizing Provider's NPI: [id number]  Order Entered By: Inez Graf RN 2020 11:29 AM     Electronically signed by: N/A              Date of Birth Social Security Number Address Home Phone MRN    1945  [address] [id number] [phone] [id number]    [employer] Marital Status          None        Admission Date Admission Type Admitting Provider Attending Provider Department, Room/Bed    20 Elective Wojciech Kathleen MD Vaughan, John J, MD [facility] 3G, S359/1    Discharge Date Discharge Disposition Discharge Destination                       Attending Provider: Wojciech Kathleen MD    Allergies: Penicillins    Isolation: None   Infection: None   Code Status: CPR    Ht: 172.7 cm (68\")   Wt: 81.2 kg (179 lb)    Admission Cmt: None   Principal Problem: Spinal stenosis of lumbar region, status post fusion [M48.061]                 Active Insurance as of 2020     Primary Coverage     Payor Plan Insurance " Group Employer/Plan Group    MEDICARE MEDICARE A & B      Payor Plan Address Payor Plan Phone Number Payor Plan Fax Number Effective Dates    [address] [phone]  2011 - None Entered    [facility] [id number]       Subscriber Name Subscriber Birth Date Member ID       DANNY LOPEZ 1945 [id number]           Secondary Coverage     Payor Plan Insurance Group Employer/Plan Group    [facility] [employer] [facility] [facility]      Payor Plan Address Payor Plan Phone Number Payor Plan Fax Number Effective Dates    [facility] [phone]  2018 - None Entered    [address]       [facility] [id number]       Subscriber Name Subscriber Birth Date Member ID       DANNY LOPEZ 1945 [id number]                 Emergency Contacts      (Rel.) Home Phone Work Phone Mobile Phone    Cyn Lopez (Spouse) -- -- [phone]    Rocio Hedrick (Daughter) -- -- [phone]               History & Physical      Jelly Graham MD at 20 1506          Patient Name: Danny Lopez  MRN: [id number]  : 1945  DOS: 2020    Attending: Wojciech Kathleen MD    Primary Care Provider: Mamadou Mckinney MD      Chief complaint:  Spinal stenosis, back pain, numbness.    Subjective   Patient is a pleasant 74 y.o. male presented for scheduled surgery by .    Patient has had previous back surgeries.  For several months he has had pain in his lower back with radiation down his right lower extremity.  This has interfered with his daily activities and he has not been able to walk due to pain.  He was diagnosed with severe spinal stenosis at L4-L5 as well as radiculopathy L4-L5.  Today he underwent the following procedures under general anesthesia:  1.  Decompressive laminectomy, bilateral medial facetectomy and bilateral foraminotomies, L4-L5, to decompress neural elements.  2.  Transforaminal lumbar interbody fusion L4-L5 with interbody fusion cage and bone graft.  3.  Segmental  "instrumentation L4-L5 with DePuy transpedicular fixation.   4.  Posterolateral fusion of L4-L5 with bone graft.   5.  Harvesting of bone graft locally from spinous processes and lamina.   He tolerated surgery well and was admitted for further management.  Seen in his room postoperatively, doing fairly well, good pain control.  No   vomiting.  Some nausea on getting up.    Significant improvement in preoperative pain with improved sensation in the right leg and improved pain.    Allergies   Allergen Reactions   • Penicillins Rash     Allergic to \"cillin\" family.       Meds:  Medications Prior to Admission   Medication Sig Dispense Refill Last Dose   • amLODIPine (NORVASC) 5 MG tablet Take 5 mg by mouth Daily.   11/5/2020 at 0430   • lisinopril (PRINIVIL,ZESTRIL) 20 MG tablet Take 20 mg by mouth Daily.   11/4/2020 at 0700   • omeprazole (priLOSEC) 20 MG capsule Take 20 mg by mouth Daily.   11/5/2020 at 0430   • traZODone (DESYREL) 50 MG tablet Take 50 mg by mouth Every Night.   11/4/2020 at 2300         History:   Past Medical History:   Diagnosis Date   • Arthritis    • Back pain    • Cancer (CMS/HCC)     basal cell skin, ear   • GERD (gastroesophageal reflux disease)    • Hypertension    • PONV (postoperative nausea and vomiting)    • Wears dentures     upper   • Wears glasses      Past Surgical History:   Procedure Laterality Date   • APPENDECTOMY  1959   • BACK SURGERY  2010   • CATARACT EXTRACTION, BILATERAL  2005   • COLONOSCOPY  2018   • LUMBAR DISCECTOMY N/A 4/27/2018    Procedure: LUMBAR DISCECTOMY REVISION L4-5;  Surgeon: Wojciech Kathleen MD;  Location: [facility];  Service: Orthopedic Spine   • SKIN BIOPSY  2017    basal cell     History reviewed. No pertinent family history.  Social History     Tobacco Use   • Smoking status: Never Smoker   • Smokeless tobacco: Never Used   Substance Use Topics   • Alcohol use: No   • Drug use: No   .  Retired from [employer].  Has 1 daughter.    Review of Systems  Pertinent " "items are noted in HPI, all other systems reviewed and negative    Vital Signs  /70 (BP Location: Right arm, Patient Position: Lying)   Pulse 89   Temp 98.4 °F (36.9 °C) (Oral)   Resp 20   Ht 172.7 cm (68\")   Wt 81.2 kg (179 lb)   SpO2 100%   BMI 27.22 kg/m²     Physical Exam:    General Appearance:    Alert, cooperative, in no acute distress   Head:    Normocephalic, without obvious abnormality, atraumatic   Eyes:            Lids and lashes normal, conjunctivae and sclerae normal, no   icterus, no pallor, corneas clear,    Ears:    Ears appear intact with no abnormalities noted   Throat:   No oral lesions, no thrush, oral mucosa moist   Neck:   No adenopathy, supple, trachea midline, no thyromegaly   Back: Clean dry and intact dressing, drain to Hemovac present.        Lungs:     Clear to auscultation,respirations regular, even and                   unlabored    Heart:    Regular rhythm and normal rate, normal S1 and S2, no            murmur, no gallop   Abdomen:     Normal bowel sounds, no masses, no organomegaly, soft        non-tender, non-distended, no guarding, no rebound                 tenderness   Genitalia:    Deferred   Extremities:   Moves all extremities well, no edema, no cyanosis, no              redness   Pulses:   Pulses palpable and equal bilaterally   Skin:   No bleeding, bruising or rash   Neurologic:   Cranial nerves 2 - 12 grossly intact, intact flexion dorsiflexion bilateral feet.      I reviewed the patient's new clinical results.       Results from last 7 days   Lab Units 11/03/20  1336   WBC 10*3/mm3 11.28*   HEMOGLOBIN g/dL 14.8   HEMATOCRIT % 44.8   PLATELETS 10*3/mm3 358     Results from last 7 days   Lab Units 11/03/20  1336   POTASSIUM mmol/L 4.5     Lab Results   Component Value Date    HGBA1C 5.70 (H) 11/03/2020       Assessment and Plan:       Spinal stenosis of lumbar region, status post fusion    Back pain    Essential hypertension    GERD without " "esophagitis  Borderline elevation hemoglobin A1c    Plan  1. PT/OT.  Ambulate.  2. Pain control-prns   3. IS-encourage  4. DVT proph- mechanicals.  5. Bowel regimen  6. Resume home medications as appropriate  7. Monitor post-op labs  8. DC planning for home.    - Hypertension:  Resume home medications as appropriate, formulary substitution when indicated.  Holding parameters.  Prn medications for elevated blood pressure.    -Explained to patient implication of A1C elevation, need to modify diet and increase activity, and f/u with PCP.         Dragon disclaimer:  Part of this encounter note is an electronic transcription/translation of spoken language to printed text. The electronic translation of spoken language may permit erroneous, or at times, nonsensical words or phrases to be inadvertently transcribed; Although I have reviewed the note for such errors, some may still exist.    Jelly Graham MD  11/05/20  15:06 EST            Electronically signed by Jelly Graham MD at 11/05/20 1754     Petey Chaves PA-C at 11/05/20 0658     Attestation signed by Wojciech Kathleen MD at 11/05/20 1050    reviewd                  Pre-Op H&P  Adiel WILLIAM [place]  [id number]  1945    Chief complaint: low back pain/numbness    HPI:    Patient is a 74 y.o.male who presents with a history of pain and numbness in the lower back. The patient states that the pain sometimes radiates down his right lower extremity. He has failed to adequately respond to conservative treatment and presents to the operating room today for surgical management.      Review of Systems:  General ROS: negative for chills, fever or skin lesions;  No changes since last office visit.  Neg for recent sick exposure  Cardiovascular ROS: no chest pain or dyspnea on exertion  Respiratory ROS: no cough, shortness of breath, or wheezing    Allergies:   Allergies   Allergen Reactions   • Penicillins Rash     Allergic to \"cillin\" family.       Home Meds:    No " "current facility-administered medications on file prior to encounter.      Current Outpatient Medications on File Prior to Encounter   Medication Sig Dispense Refill   • lisinopril (PRINIVIL,ZESTRIL) 20 MG tablet Take 20 mg by mouth Daily.     • omeprazole (priLOSEC) 20 MG capsule Take 20 mg by mouth Daily.         PMH:   Past Medical History:   Diagnosis Date   • Arthritis    • Back pain    • Cancer (CMS/HCC)     basal cell skin, ear   • GERD (gastroesophageal reflux disease)    • Hypertension    • PONV (postoperative nausea and vomiting)    • Wears dentures     upper   • Wears glasses      PSH:    Past Surgical History:   Procedure Laterality Date   • APPENDECTOMY  1959   • BACK SURGERY  2010   • CATARACT EXTRACTION, BILATERAL  2005   • COLONOSCOPY  2018   • LUMBAR DISCECTOMY N/A 4/27/2018    Procedure: LUMBAR DISCECTOMY REVISION L4-5;  Surgeon: Wojciech Kathleen MD;  Location: [facility];  Service: Orthopedic Spine   • SKIN BIOPSY  2017    basal cell       Immunization History:  Influenza: 2020  Pneumococcal: denies  Tetanus: patient unsure of date     Social History:   Tobacco:   Social History     Tobacco Use   Smoking Status Never Smoker   Smokeless Tobacco Never Used      Alcohol:     Social History     Substance and Sexual Activity   Alcohol Use No       Vitals:           /84 (BP Location: Right arm, Patient Position: Lying)   Pulse 91   Temp 97.8 °F (36.6 °C) (Temporal)   Resp 16   Ht 172.7 cm (68\")   Wt 81.2 kg (179 lb)   SpO2 99%   BMI 27.22 kg/m²     Physical Exam:  General Appearance:    Alert, cooperative, no distress, appears stated age   Head:    Normocephalic, without obvious abnormality, atraumatic   Lungs:     Clear to auscultation bilaterally, respirations unlabored    Heart:   Regular rate and rhythm, S1 and S2 normal, no murmur, rub    or gallop    Abdomen:    Soft, nontender.  +bowel sounds   Breast Exam:    deferred   Genitalia:    deferred   Extremities:   Extremities normal, " atraumatic, no cyanosis or edema   Skin:   Skin color, texture, turgor normal, no rashes or lesions   Neurologic:   Grossly intact   Results Review  LABS:  Lab Results   Component Value Date    WBC 11.28 (H) 11/03/2020    HGB 14.8 11/03/2020    HCT 44.8 11/03/2020    MCV 96.6 11/03/2020     11/03/2020    K 4.5 11/03/2020       RADIOLOGY:  Imaging Results (Last 72 Hours)     ** No results found for the last 72 hours. **          Cancer Staging (if applicable)  Cancer Patient: __ yes _x_no __unknown; If yes, clinical stage T:__ N:__M:__, stage group or __N/A    Impression: Recurrent lumbar disc herniation    Plan: Lumbar spinal fusion    Petey Chaves PA-C   11/5/2020   06:58 EST    Electronically signed by Wojciech Kathleen MD at 11/05/20 1050       Operative/Procedure Notes (last 24 hours) (Notes from 11/05/20 1134 through 11/06/20 1134)    No notes of this type exist for this encounter.         Physician Progress Notes (last 24 hours) (Notes from 11/05/20 1134 through 11/06/20 1134)    No notes of this type exist for this encounter.         Consult Notes (last 24 hours) (Notes from 11/05/20 1134 through 11/06/20 1134)    No notes of this type exist for this encounter.

## 2020-11-06 NOTE — PROGRESS NOTES
Discharge Planning Assessment  [facility]     Patient Name: Adiel Lopez  MRN: [id number]  Today's Date: 11/6/2020    Admit Date: 11/5/2020    Discharge Needs Assessment     Row Name 11/06/20 1408       Living Environment    Lives With  spouse    Name(s) of Who Lives With Patient  Cyn    Unique Family Situation  They have a home in [place] and a condo in [place] near daughter.    Current Living Arrangements  home/apartment/condo    Duration at Residence  Few years but is a second home.    Primary Care Provided by  self    Provides Primary Care For  no one    Family Caregiver if Needed  child(brendan), adult;spouse    Family Caregiver Names  Cyn is spouse and Rocio is daughter, RN    Quality of Family Relationships  helpful;involved;supportive    Able to Return to Prior Arrangements  yes    Living Arrangement Comments  see above       Transition Planning    Patient/Family Anticipates Transition to  home with family    Patient/Family Anticipated Services at Transition  durable medical equipment    Transportation Anticipated  family or friend will provide       Discharge Needs Assessment    Readmission Within the Last 30 Days  no previous admission in last 30 days    Equipment Currently Used at Home  none    Concerns to be Addressed  discharge planning    Concerns Comments  Plans for outpt PT    Anticipated Changes Related to Illness  other (see comments) spinal surgery    Equipment Needed After Discharge  walker, rolling    Outpatient/Agency/Support Group Needs  outpatient therapy    Discharge Facility/Level of Care Needs  outpatient therapy    Provided Post Acute Provider List?  N/A They want [facility] PT    Patient's Choice of Community Agency(s)  [facility] PT    Discharge Coordination/Progress  A referral was received for dc planning. Met with pt and wife. They are staying at their second home in [place].  It is a condo that is near their daughter, Rocio. No DME but does want a walker with wheels.  PCP  is Dr. Mckinney. Medical insurance is Medicare A&B and AARP supp. No concerns for coverage of services or med's. No Living will. Prefers Outpt PT at [facility].  The referral was called and faxed ([phone].  Pt has transport home.  No additional needs at this time. Mouna [facility]NER ext [phone]        Discharge Plan     Row Name 11/06/20 1417       Plan    Plan  Home with outpt PT and a rolling walker. [employer] has delivered the walker to the room.  The referral for outpt has been called and faxed. Mouna SKINNER ext [phone]    Plan Comments  Home with outpt PT    Final Discharge Disposition Code  01 - home or self-care    Final Note  Home with outpt PT        Continued Care and Services - Admitted Since 11/5/2020    Coordination has not been started for this encounter.         Demographic Summary    No documentation.       Functional Status    No documentation.       Psychosocial    No documentation.       Abuse/Neglect    No documentation.       Legal    No documentation.       Substance Abuse    No documentation.       Patient Forms    No documentation.           Inez Graf RN

## 2020-11-06 NOTE — PLAN OF CARE
Problem: Adult Inpatient Plan of Care  Goal: Plan of Care Review  Recent Flowsheet Documentation  Taken 11/6/2020 0928 by Livia Piña OT  Progress: improving  Plan of Care Reviewed With:   patient   spouse  Outcome Summary: OT IE completed post chart review. Pt presents with mild decrease in occupational I in ADLs, related t/f compared to PLOF. Pt grossly limited by pain, decreased fxl endurance and limited distal reach with decreased competency on spinal prec. OT educated pt on prec and log roll tech with improved understanding throughout. Pt completed sit < > Stand at EOB with SBA, CGA toilet t/f, gross SBA with fxl mobility with no AD, gait belt support. Pt completed toileting with spv, UBD with CGA (posterior mgmt), hand hygiene with I and LBD with progression from max A d/d socks to SBA-CGA with AE. OT educated pt on each AE and pt initiated return demo.Pt could benefit from one more session for AE application to ADLs, however spouse present and cognizant on AE. Recommend home with assistance at d/c when medically ready.

## 2020-11-06 NOTE — PROGRESS NOTES
"IM progress note      Adiel Lopez  [id number]  1945     LOS: 1 day     Attending: Wojciech Kathleen MD    Primary Care Provider: Mamadou Mckinney MD      Chief Complaint/Reason for visit:  No chief complaint on file.      Subjective   Doing well.  Some incisional pain.  Ambulated, tolerated p.o. diet, voided.  No nausea, vomiting, or shortness of breath.    Objective        Visit Vitals  /78   Pulse 81   Temp 98.2 °F (36.8 °C) (Oral)   Resp 16   Ht 172.7 cm (68\")   Wt 81.2 kg (179 lb)   SpO2 96%   BMI 27.22 kg/m²     Temp (24hrs), Av.5 °F (36.9 °C), Min:98.1 °F (36.7 °C), Max:98.9 °F (37.2 °C)      Intake/Output:    Intake/Output Summary (Last 24 hours) at 2020 1241  Last data filed at 2020 0900  Gross per 24 hour   Intake 600 ml   Output 2415 ml   Net -1815 ml        Physical Therapy:Taken 2020 1050 by Adwoa Deluna, PT  Plan of Care Reviewed With:  • patient  • spouse  Outcome Summary: PT assessment noted impulsive mobility with limited understanding of safety concerns. Entered room with chair alarm activated as spouse and pt attempting to go to bathroom. Discussed need to call out for staff assistance and importance of fall precautions. Patient able to ambulated with CGA needing verbal cues for upright posture which he was able to maintain with minimal correction. Appropriate to continue therapy to help promote safe mobility education and HEP. RN aware of alarm situation after treatment.    Physical Exam:     General Appearance:    Alert, cooperative, in no acute distress   Head:    Normocephalic, without obvious abnormality, atraumatic    Lungs:     Normal effort, symmetric chest rise,  clear to      auscultation bilaterally              Heart:    Regular rhythm and normal rate, normal S1 and S2    Abdomen:     Normal bowel sounds, no masses, no organomegaly, soft        non-tender, non-distended, no guarding, no rebound                Tenderness  Back: Clean dry and intact " dressing.  Drain to Hemovac present.   Extremities:   No clubbing, cyanosis or edema.  No deformities.    Pulses:   Pulses palpable and equal bilaterally   Skin:   No bleeding, bruising or rash  Neuro: No gross motor deficit.  Intact flexion dorsiflexion bilateral feet.          Results Review:     I reviewed the patient's new clinical results.   Results from last 7 days   Lab Units 11/06/20  0715 11/03/20  1336   WBC 10*3/mm3 17.06* 11.28*   HEMOGLOBIN g/dL 12.9* 14.8   HEMATOCRIT % 39.5 44.8   PLATELETS 10*3/mm3 328 358     Results from last 7 days   Lab Units 11/06/20  0715 11/03/20  1336   SODIUM mmol/L 141  --    POTASSIUM mmol/L 4.7 4.5   CHLORIDE mmol/L 105  --    CO2 mmol/L 25.0  --    BUN mg/dL 14  --    CREATININE mg/dL 0.86  --    CALCIUM mg/dL 9.2  --    GLUCOSE mg/dL 121*  --      I reviewed the patient's new imaging including images and reports.    All medications reviewed.   amLODIPine, 5 mg, Oral, Daily  docusate sodium, 100 mg, Oral, BID  famotidine, 20 mg, Intravenous, Q12H    Or  famotidine, 20 mg, Oral, Q12H  lisinopril, 20 mg, Oral, Daily  pantoprazole, 40 mg, Oral, Q AM  polyethylene glycol, 17 g, Oral, Daily  sodium chloride, 3 mL, Intravenous, Q12H  traZODone, 50 mg, Oral, Nightly      acetaminophen, 650 mg, Q4H PRN  cyclobenzaprine, 10 mg, TID PRN  HYDROmorphone, 1 mg, Q4H PRN    And  naloxone, 0.4 mg, Q5 Min PRN  labetalol, 10 mg, Q4H PRN  Morphine, 1 mg, Q4H PRN    And  naloxone, 0.4 mg, Q5 Min PRN  ondansetron, 4 mg, Q6H PRN  oxyCODONE-acetaminophen, 1 tablet, Q4H PRN  sodium chloride, 500 mL, TID PRN  sodium chloride, 10 mL, PRN        Assessment/Plan       Spinal stenosis of lumbar region, status post fusion    Back pain    Essential hypertension    GERD without esophagitis         Plan  1. PT/OT.  Ambulate.  2. Pain control-prns       3. IS-encourage  4. DVT proph- mechanicals.  5. Bowel regimen  6. Resume home medications as appropriate  7. Monitor post-op labs  8. DC planning for  home.  Likely tomorrow 11/7/2020     - Hypertension:  Resume home medications as appropriate, formulary substitution when indicated.  Holding parameters.  Prn medications for elevated blood pressure.     -Explained to patient implication of A1C elevation, need to modify diet and increase activity, and f/u with PCP.    Jelly Graham MD  11/06/20  12:41 EST

## 2020-11-06 NOTE — PLAN OF CARE
Goal Outcome Evaluation:  Plan of Care Reviewed With: patient  Progress: improving  Outcome Summary: Pt alert and orinted. VSS. Voiding spontaneously. Neuro intact. Ambulated 690 ft total this shift with GBRW. Pain managed with Po pain medication. 140 from Hemovach this shift.

## 2020-11-07 VITALS
WEIGHT: 179 LBS | DIASTOLIC BLOOD PRESSURE: 78 MMHG | HEIGHT: 68 IN | HEART RATE: 86 BPM | BODY MASS INDEX: 27.13 KG/M2 | TEMPERATURE: 98 F | OXYGEN SATURATION: 95 % | RESPIRATION RATE: 18 BRPM | SYSTOLIC BLOOD PRESSURE: 136 MMHG

## 2020-11-07 PROCEDURE — 63710000001 LISINOPRIL 20 MG TABLET: Performed by: INTERNAL MEDICINE

## 2020-11-07 PROCEDURE — 63710000001 OXYCODONE-ACETAMINOPHEN 10-325 MG TABLET: Performed by: ORTHOPAEDIC SURGERY

## 2020-11-07 PROCEDURE — 63710000001 PANTOPRAZOLE 40 MG TABLET DELAYED-RELEASE: Performed by: INTERNAL MEDICINE

## 2020-11-07 PROCEDURE — 97116 GAIT TRAINING THERAPY: CPT | Performed by: PHYSICAL THERAPIST

## 2020-11-07 PROCEDURE — A9270 NON-COVERED ITEM OR SERVICE: HCPCS | Performed by: ORTHOPAEDIC SURGERY

## 2020-11-07 PROCEDURE — A9270 NON-COVERED ITEM OR SERVICE: HCPCS | Performed by: INTERNAL MEDICINE

## 2020-11-07 PROCEDURE — 63710000001 POLYETHYLENE GLYCOL 17 G PACK: Performed by: INTERNAL MEDICINE

## 2020-11-07 PROCEDURE — 63710000001 FAMOTIDINE 20 MG TABLET: Performed by: ORTHOPAEDIC SURGERY

## 2020-11-07 PROCEDURE — 63710000001 DOCUSATE SODIUM 100 MG CAPSULE: Performed by: INTERNAL MEDICINE

## 2020-11-07 PROCEDURE — 63710000001 AMLODIPINE 5 MG TABLET: Performed by: INTERNAL MEDICINE

## 2020-11-07 PROCEDURE — 25010000002 HYDROMORPHONE 1 MG/ML SOLUTION: Performed by: ORTHOPAEDIC SURGERY

## 2020-11-07 RX ORDER — DOCUSATE SODIUM 100 MG/1
100 CAPSULE, LIQUID FILLED ORAL 2 TIMES DAILY
Qty: 40 CAPSULE | Refills: 0 | Status: SHIPPED | OUTPATIENT
Start: 2020-11-07

## 2020-11-07 RX ORDER — HYDROCODONE BITARTRATE AND ACETAMINOPHEN 7.5; 325 MG/1; MG/1
1 TABLET ORAL EVERY 6 HOURS PRN
Start: 2020-11-07

## 2020-11-07 RX ORDER — POLYETHYLENE GLYCOL 3350 17 G/17G
17 POWDER, FOR SOLUTION ORAL DAILY
Qty: 15 PACKET | Refills: 0 | Status: SHIPPED | OUTPATIENT
Start: 2020-11-08

## 2020-11-07 RX ADMIN — DOCUSATE SODIUM 100 MG: 100 CAPSULE ORAL at 09:10

## 2020-11-07 RX ADMIN — LISINOPRIL 20 MG: 20 TABLET ORAL at 09:10

## 2020-11-07 RX ADMIN — AMLODIPINE BESYLATE 5 MG: 5 TABLET ORAL at 09:10

## 2020-11-07 RX ADMIN — POLYETHYLENE GLYCOL 3350 17 G: 17 POWDER, FOR SOLUTION ORAL at 09:10

## 2020-11-07 RX ADMIN — OXYCODONE HYDROCHLORIDE AND ACETAMINOPHEN 1 TABLET: 10; 325 TABLET ORAL at 02:40

## 2020-11-07 RX ADMIN — OXYCODONE HYDROCHLORIDE AND ACETAMINOPHEN 1 TABLET: 10; 325 TABLET ORAL at 09:10

## 2020-11-07 RX ADMIN — PANTOPRAZOLE SODIUM 40 MG: 40 TABLET, DELAYED RELEASE ORAL at 05:27

## 2020-11-07 RX ADMIN — FAMOTIDINE 20 MG: 20 TABLET ORAL at 09:10

## 2020-11-07 NOTE — PLAN OF CARE
Problem: Adult Inpatient Plan of Care  Goal: Plan of Care Review  Recent Flowsheet Documentation  Taken 11/7/2020 1356 by Rossi Jauregui PT  Progress: improving  Plan of Care Reviewed With: patient  Outcome Summary: Pt able to amb 350' with RW SBA with step through gait mechanics. Pt able to ascend/descend 2 steps with HHA CGA. Progressed patient's HEP and patient tolerated well. Provided handout of exercises to continue at home. Recommend patient home with assist at discharge.

## 2020-11-07 NOTE — DISCHARGE SUMMARY
Patient Name: Adiel Lopez  MRN: [id number]  : 1945  DOS: 2020    Attending: Wojciech Kathleen MD    Primary Care Provider: Mamadou Mckinney MD    Date of Admission:.2020  5:05 AM    Date of Discharge:  2020    Discharge Diagnosis:     Spinal stenosis of lumbar region, status post fusion    Back pain    Essential hypertension    GERD without esophagitis  Leukocytosis, reactive    Hospital Course  At Admit:  Patient is a pleasant 74 y.o. male presented for scheduled surgery by .    Patient has had previous back surgeries.  For several months he has had pain in his lower back with radiation down his right lower extremity.  This has interfered with his daily activities and he has not been able to walk due to pain.  He was diagnosed with severe spinal stenosis at L4-L5 as well as radiculopathy L4-L5.  Today he underwent the following procedures under general anesthesia:  1.  Decompressive laminectomy, bilateral medial facetectomy and bilateral foraminotomies, L4-L5, to decompress neural elements.  2.  Transforaminal lumbar interbody fusion L4-L5 with interbody fusion cage and bone graft.  3.  Segmental instrumentation L4-L5 with DePuy transpedicular fixation.   4.  Posterolateral fusion of L4-L5 with bone graft.   5.  Harvesting of bone graft locally from spinous processes and lamina.   He tolerated surgery well and was admitted for further management.  Seen in his room postoperatively, doing fairly well, good pain control.  No   vomiting.  Some nausea on getting up.    Significant improvement in preoperative pain with improved sensation in the right leg and improved pain.       After admit:    Patient was provided pain medications as needed for pain control.    Adjustments were made to pain medications to optimize postop pain management. Risks and benefits of opiate medications discussed with patient.    Patient was seen by PT and has progressed well over his stay.    He used an IS  for atelectasis prophylaxis and mechanicals for DVT prophylaxis.  Home medications were resumed as appropriate, and labs were monitored and remained fairly stable.     With the progress patient has made, Mr. Lopez is ready for DC home today.      Discussed with patient regarding plan and he shows understanding and agreement.    Drain/Hemovac that was placed intraoperatively was removed prior to discharge.    Pain medication at discharge .      PREOPERATIVE DIAGNOSES:  1.  L4-L5 severe spinal stenosis.  2.  L4-L5 radiculopathy.  3.  Postlaminectomy syndrome L4-L5.  4.  Recurrent disk herniation, L4-L5.      POSTOPERATIVE DIAGNOSES:  1.  L4-L5 severe spinal stenosis.  2.  L4-L5 radiculopathy.  3.  Postlaminectomy syndrome L4-L5.  4.  Recurrent disk herniation, L4-L5.      PROCEDURES PERFORMED:  1.  Decompressive laminectomy, bilateral medial facetectomy and bilateral foraminotomies, L4-L5, to decompress neural elements.  2.  Transforaminal lumbar interbody fusion L4-L5 with interbody fusion cage and bone graft.  3.  Segmental instrumentation L4-L5 with DePuy transpedicular fixation.   4.  Posterolateral fusion of L4-L5 with bone graft.   5.  Harvesting of bone graft locally from spinous processes and lamina.      SURGEON: Wojciech Kathleen MD  Pertinent Test Results:    I reviewed the patient's new clinical results.   Results from last 7 days   Lab Units 11/06/20  0715 11/03/20  1336   WBC 10*3/mm3 17.06* 11.28*   HEMOGLOBIN g/dL 12.9* 14.8   HEMATOCRIT % 39.5 44.8   PLATELETS 10*3/mm3 328 358     Results from last 7 days   Lab Units 11/06/20  0715 11/03/20  1336   SODIUM mmol/L 141  --    POTASSIUM mmol/L 4.7 4.5   CHLORIDE mmol/L 105  --    CO2 mmol/L 25.0  --    BUN mg/dL 14  --    CREATININE mg/dL 0.86  --    CALCIUM mg/dL 9.2  --    GLUCOSE mg/dL 121*  --      I reviewed the patient's new imaging including images and reports.      Physical therapy  Taken 11/7/2020 1356 by Rossi Jauregui, PT  Progress:  "improving  Plan of Care Reviewed With: patient  Outcome Summary: Pt able to amb 350' with RW SBA with step through gait mechanics. Pt able to ascend/descend 2 steps with HHA CGA. Progressed patient's HEP and patient tolerated well. Provided handout of exercises to continue at home. Recommend patient home with assist at discharge.         Discharge Assessment:      Visit Vitals  /76   Pulse 94   Temp 98.2 °F (36.8 °C) (Oral)   Resp 16   Ht 172.7 cm (68\")   Wt 81.2 kg (179 lb)   SpO2 95%   BMI 27.22 kg/m²     Temp (24hrs), Av.2 °F (36.8 °C), Min:97.9 °F (36.6 °C), Max:98.3 °F (36.8 °C)      General Appearance:    Alert, cooperative, in no acute distress      Lungs:     Clear to auscultation,respirations regular, even and                   unlabored    Heart:    Regular rhythm and normal rate, normal S1 and S2    Abdomen:     Normal bowel sounds, no masses, no organomegaly, soft        non-tender, non-distended, no guarding, no rebound                 tenderness   Extremities:   Moves all extremities well, no edema, no cyanosis, no              Redness  Back: Clean dry and intact dressing, drain, ordered out prior to discharge   Pulses:   Pulses palpable and equal bilaterally   Skin:   No bleeding, bruising or rash   Neurologic:   Cranial nerves 2 - 12 grossly intact, sensation intact, no gross deficit       Discharge Disposition: Home          Discharge Medications      New Medications      Instructions Start Date   docusate sodium 100 MG capsule  Commonly known as: Colace   100 mg, Oral, 2 Times Daily      HYDROcodone-acetaminophen 7.5-325 MG per tablet  Commonly known as: Norco   1 tablet, Oral, Every 6 Hours PRN      polyethylene glycol 17 g packet  Commonly known as: MIRALAX   17 g, Oral, Daily   Start Date: 2020        Continue These Medications      Instructions Start Date   amLODIPine 5 MG tablet  Commonly known as: NORVASC   5 mg, Oral, Daily      lisinopril 20 MG tablet  Commonly known " as: PRINIVIL,ZESTRIL   20 mg, Oral, Daily      omeprazole 20 MG capsule  Commonly known as: priLOSEC   20 mg, Oral, Daily      traZODone 50 MG tablet  Commonly known as: DESYREL   50 mg, Oral, Nightly             Discharge Diet: Resume prehospital diet    Activity at Discharge: Ambulate.  Back restrictions per .    Follow-up Appointments  Wojciech Kathleen MD per his orders  Dr. Mckinney as previously scheduled.       Jelly Graham MD  11/07/20  11:06 EST

## 2020-11-07 NOTE — THERAPY TREATMENT NOTE
Patient Name: Adiel Lopez  : 1945    MRN: [id number]                              Today's Date: 2020       Admit Date: 2020    Visit Dx:     ICD-10-CM ICD-9-CM   1. Spinal stenosis of lumbar region, unspecified whether neurogenic claudication present  M48.061 724.02     Patient Active Problem List   Diagnosis   • Back pain   • Essential hypertension   • GERD without esophagitis   • Spinal stenosis of lumbar region, status post fusion     Past Medical History:   Diagnosis Date   • Arthritis    • Back pain    • Cancer (CMS/HCC)     basal cell skin, ear   • GERD (gastroesophageal reflux disease)    • Hypertension    • PONV (postoperative nausea and vomiting)    • Wears dentures     upper   • Wears glasses      Past Surgical History:   Procedure Laterality Date   • APPENDECTOMY     • BACK SURGERY     • CATARACT EXTRACTION, BILATERAL     • COLONOSCOPY     • LUMBAR DISCECTOMY N/A 2018    Procedure: LUMBAR DISCECTOMY REVISION L4-5;  Surgeon: Wojciech Kathleen MD;  Location:  [facility];  Service: Orthopedic Spine   • LUMBAR DISCECTOMY FUSION INSTRUMENTATION N/A 2020    Procedure: LUMBAR SPINAL FUSION L4-5;  Surgeon: Wojciech Kathleen MD;  Location:  [facility];  Service: Orthopedic Spine;  Laterality: N/A;   • SKIN BIOPSY  2017    basal cell     General Information     Row Name 20 109          Physical Therapy Time and Intention    Document Type  therapy note (daily note)  -CS     Mode of Treatment  individual therapy;physical therapy  -CS     Row Name 20 1356          General Information    Patient Profile Reviewed  yes  -CS     Existing Precautions/Restrictions  fall;spinal  -CS     Row Name 20 1356          Home Main Entrance    Number of Stairs, Main Entrance  two  -CS     Stair Railings, Main Entrance  none  -CS     Row Name 20 1356          Cognition    Orientation Status (Cognition)  oriented x 4  -CS     Row Name 20 135          Safety Issues,  Functional Mobility    Safety Issues Affecting Function (Mobility)  safety precautions follow-through/compliance;safety precaution awareness;insight into deficits/self-awareness  -CS     Impairments Affecting Function (Mobility)  endurance/activity tolerance;postural/trunk control;range of motion (ROM);strength  -CS     Comment, Safety Issues/Impairments (Mobility)  Pt required VC's to not twist when ambulating to see objects  -CS       User Key  (r) = Recorded By, (t) = Taken By, (c) = Cosigned By    Initials Name Provider Type    CS Rossi Jauregui, PT Physical Therapist        Mobility     Row Name 11/07/20 0382          Bed Mobility    Comment (Bed Mobility)  Pt sitting UIC at start and end of therapy session. Pt unable to recall spinal precautions - reviewed precautions and provided handout to patient  -CS     Row Name 11/07/20 4705          Transfers    Comment (Transfers)  VC's to push off of chair to stand and to reach back for chair to sit. VC's to stay within walker until feel LE's on back of chair.  -CS     Row Name 11/07/20 5193          Sit-Stand Transfer    Sit-Stand Craig (Transfers)  verbal cues;modified independence  -CS     Assistive Device (Sit-Stand Transfers)  walker, front-wheeled  -CS     Row Name 11/07/20 1358          Gait/Stairs (Locomotion)    Craig Level (Gait)  standby assist;verbal cues  -CS     Assistive Device (Gait)  walker, front-wheeled  -CS     Distance in Feet (Gait)  350'  -CS     Deviations/Abnormal Patterns (Gait)  bilateral deviations;oma decreased;gait speed decreased;stride length decreased  -CS     Bilateral Gait Deviations  forward flexed posture;heel strike decreased  -CS     Craig Level (Stairs)  contact guard  -CS     Assistive Device (Stairs)  other (see comments) HHA  -CS     Handrail Location (Stairs)  none  -CS     Number of Steps (Stairs)  2  -CS     Ascending Technique (Stairs)  step-to-step  -CS     Descending Technique (Stairs)   step-to-step  -CS     Stairs, Impairments  strength decreased;ROM decreased  -CS     Comment (Gait/Stairs)  Pt able to amb with slow, step through gait mechanics with RW. VC's to stay within walker, to maintain upright posture, and to avoid twisting when looking at objects in the hallway. Pt able to ascend/descend 2 steps with HHA with VC's to step up with strong leg and down with weaker leg. Pt able to demo safely and wife observed. Pt limited with mobility due to fatigue.  -CS       User Key  (r) = Recorded By, (t) = Taken By, (c) = Cosigned By    Initials Name Provider Type    Rossi Finch PT Physical Therapist        Obj/Interventions     Row Name 11/07/20 1356          Motor Skills    Therapeutic Exercise  other (see comments) ankle pumps, glute sets, quad sets, ab brace, hip IR/ER, heel slides, BKFO's, ab brace with overhead lift - 10 reps each  -     Row Name 11/07/20 1356          Balance    Balance Assessment  sitting dynamic balance;standing static balance;standing dynamic balance  -CS     Static Sitting Balance  WFL;supported;sitting in chair  -CS     Static Standing Balance  WFL;unsupported  -CS     Dynamic Standing Balance  WFL;supported;standing  -CS       User Key  (r) = Recorded By, (t) = Taken By, (c) = Cosigned By    Initials Name Provider Type     Rossi Jauregui PT Physical Therapist        Goals/Plan     Row Name 11/07/20 1351          Bed Mobility Goal 1 (PT)    Activity/Assistive Device (Bed Mobility Goal 1, PT)  bed mobility activities, all  -CS     [place]ar Level/Cues Needed (Bed Mobility Goal 1, PT)  independent  -CS     Time Frame (Bed Mobility Goal 1, PT)  long term goal (LTG);3 days  -CS     Progress/Outcomes (Bed Mobility Goal 1, PT)  goal ongoing  -CS     Row Name 11/07/20 1356          Transfer Goal 1 (PT)    Activity/Assistive Device (Transfer Goal 1, PT)  sit-to-stand/stand-to-sit  -CS     [place] Level/Cues Needed (Transfer Goal 1, PT)  modified  independence  -CS     Time Frame (Transfer Goal 1, PT)  long term goal (LTG);2 days  -CS     Progress/Outcome (Transfer Goal 1, PT)  goal met  -CS     Row Name 11/07/20 0590          Gait Training Goal 1 (PT)    Activity/Assistive Device (Gait Training Goal 1, PT)  gait (walking locomotion);walker, rolling  -CS     [place] Level (Gait Training Goal 1, PT)  supervision required  -CS     Distance (Gait Training Goal 1, PT)  350  -CS     Time Frame (Gait Training Goal 1, PT)  long term goal (LTG);3 days  -CS     Progress/Outcome (Gait Training Goal 1, PT)  goal ongoing  -CS     Row Name 11/07/20 1356          Stairs Goal 1 (PT)    Activity/Assistive Device (Stairs Goal 1, PT)  stairs, all skills;other (see comments) HHA or STC  -CS     [place]ling Level/Cues Needed (Stairs Goal 1, PT)  standby assist  -CS     Number of Stairs (Stairs Goal 1, PT)  2  -CS     Time Frame (Stairs Goal 1, PT)  long term goal (LTG);3 days  -CS     Progress/Outcome (Stairs Goal 1, PT)  goal ongoing  -CS       User Key  (r) = Recorded By, (t) = Taken By, (c) = Cosigned By    Initials Name Provider Type    CS Rossi Jauregui, PT Physical Therapist        Clinical Impression     Row Name 11/07/20 1341          Pain    Additional Documentation  Pain Scale: Numbers Pre/Post-Treatment (Group)  -CS     Row Name 11/07/20 3527          Pain Scale: Numbers Pre/Post-Treatment    Pretreatment Pain Rating  4/10  -CS     Posttreatment Pain Rating  4/10  -CS     Pain Location - Side  Bilateral  -CS     Pain Location - Orientation  lower  -CS     Pain Location  back  -CS     Pain Intervention(s)  Repositioned;Ambulation/increased activity  -CS     Row Name 11/07/20 6626          Plan of Care Review    Plan of Care Reviewed With  patient  -CS     Progress  improving  -CS     Outcome Summary  Pt able to amb 350' with RW SBA with step through gait mechanics. Pt able to ascend/descend 2 steps with HHA CGA. Progressed patient's HEP and patient tolerated  well. Provided handout of exercises to continue at home. Recommend patient home with assist at discharge.  -CS     Row Name 11/07/20 1356          Therapy Assessment/Plan (PT)    Rehab Potential (PT)  good, to achieve stated therapy goals  -CS     Criteria for Skilled Interventions Met (PT)  yes;meets criteria  -CS     Row Name 11/07/20 1356          Positioning and Restraints    Pre-Treatment Position  sitting in chair/recliner  -CS     Post Treatment Position  chair  -CS     In Chair  sitting;call light within reach;encouraged to call for assist;exit alarm on;with family/caregiver;notified nsg  -CS       User Key  (r) = Recorded By, (t) = Taken By, (c) = Cosigned By    Initials Name Provider Type    CS Rossi Jauregui PT Physical Therapist        Outcome Measures     Row Name 11/07/20 1356          How much help from another person do you currently need...    Turning from your back to your side while in flat bed without using bedrails?  3  -CS     Moving from lying on back to sitting on the side of a flat bed without bedrails?  3  -CS     Moving to and from a bed to a chair (including a wheelchair)?  4  -CS     Standing up from a chair using your arms (e.g., wheelchair, bedside chair)?  4  -CS     Climbing 3-5 steps with a railing?  3  -CS     To walk in hospital room?  4  -CS     AM-PAC 6 Clicks Score (PT)  21  -CS       User Key  (r) = Recorded By, (t) = Taken By, (c) = Cosigned By    Initials Name Provider Type    CS Rossi Jauregui PT Physical Therapist        Physical Therapy Education                 Title: PT OT SLP Therapies (Done)     Topic: Physical Therapy (Done)     Point: Mobility training (Done)     Learning Progress Summary           Patient Acceptance, E,D,H, DU,VU by CS at 11/7/2020 1356    Comment: Educated patient on safe hand placement with transfers, spinal precautions, gait mechanics, sequencing with stairs, ther ex, and plan for progression of care.    Acceptance, E,TB, VU by  at  11/6/2020 1015   Significant Other Acceptance, E,D,H, DU,VU by CS at 11/7/2020 1356    Comment: Educated patient on safe hand placement with transfers, spinal precautions, gait mechanics, sequencing with stairs, ther ex, and plan for progression of care.                   Point: Home exercise program (Done)     Learning Progress Summary           Patient Acceptance, E,D,H, DU,VU by CS at 11/7/2020 1356    Comment: Educated patient on safe hand placement with transfers, spinal precautions, gait mechanics, sequencing with stairs, ther ex, and plan for progression of care.    Acceptance, E,TB, VU by ES at 11/6/2020 1015   Significant Other Acceptance, E,D,H, DU,VU by CS at 11/7/2020 1356    Comment: Educated patient on safe hand placement with transfers, spinal precautions, gait mechanics, sequencing with stairs, ther ex, and plan for progression of care.                   Point: Body mechanics (Done)     Learning Progress Summary           Patient Acceptance, E,D,H, DU,VU by CS at 11/7/2020 1356    Comment: Educated patient on safe hand placement with transfers, spinal precautions, gait mechanics, sequencing with stairs, ther ex, and plan for progression of care.    Acceptance, E,TB, VU by ES at 11/6/2020 1015   Significant Other Acceptance, E,D,H, DU,VU by CS at 11/7/2020 1356    Comment: Educated patient on safe hand placement with transfers, spinal precautions, gait mechanics, sequencing with stairs, ther ex, and plan for progression of care.                   Point: Precautions (Done)     Learning Progress Summary           Patient Acceptance, E,D,H, DU,VU by CS at 11/7/2020 1356    Comment: Educated patient on safe hand placement with transfers, spinal precautions, gait mechanics, sequencing with stairs, ther ex, and plan for progression of care.    Acceptance, E,TB, VU by ES at 11/6/2020 1015   Significant Other Acceptance, E,D,H, DU,VU by CS at 11/7/2020 1356    Comment: Educated patient on safe hand placement  with transfers, spinal precautions, gait mechanics, sequencing with stairs, ther ex, and plan for progression of care.                               User Key     Initials Effective Dates Name Provider Type Discipline    ES 08/02/16 -  Adwoa Deluna PT Physical Therapist PT     03/26/19 -  Rossi Jauregui PT Physical Therapist PT              PT Recommendation and Plan  Planned Therapy Interventions (PT): balance training, bed mobility training, gait training, home exercise program, neuromuscular re-education, patient/family education, ROM (range of motion), stair training, strengthening, transfer training  Plan of Care Reviewed With: patient  Progress: improving  Outcome Summary: Pt able to amb 350' with RW SBA with step through gait mechanics. Pt able to ascend/descend 2 steps with HHA CGA. Progressed patient's HEP and patient tolerated well. Provided handout of exercises to continue at home. Recommend patient home with assist at discharge.     Time Calculation:   PT Charges     Row Name 11/07/20 1356             Time Calculation    Start Time  1356  -CS      PT Received On  11/07/20  -CS         Time Calculation- PT    Total Timed Code Minutes- PT  15 minute(s)  -CS         Timed Charges    12582 - PT Therapeutic Exercise Minutes  5  -CS      90241 - Gait Training Minutes   10  -CS        User Key  (r) = Recorded By, (t) = Taken By, (c) = Cosigned By    Initials Name Provider Type    CS Rossi Jauregui PT Physical Therapist        Therapy Charges for Today     Code Description Service Date Service Provider Modifiers Qty    [id number] HC GAIT TRAINING EA 15 MIN 11/7/2020 Rossi Jauregui, PT GP 1          PT G-Codes  Outcome Measure Options: AM-PAC 6 Clicks Basic Mobility (PT)  AM-PAC 6 Clicks Score (PT): 21  AM-PAC 6 Clicks Score (OT): 20    Rossi Jauregui PT  11/7/2020

## 2020-11-07 NOTE — PLAN OF CARE
Goal Outcome Evaluation:  Plan of Care Reviewed With: patient    Problem: Adult Inpatient Plan of Care  Goal: Plan of Care Review  Outcome: Met  Goal: Patient-Specific Goal (Individualized)  Outcome: Met  Goal: Absence of Hospital-Acquired Illness or Injury  Outcome: Met  Intervention: Identify and Manage Fall Risk  Recent Flowsheet Documentation  Taken 11/7/2020 1330 by Haley Shaw, RN  Safety Promotion/Fall Prevention:   activity supervised   assistive device/personal items within reach   clutter free environment maintained   fall prevention program maintained   gait belt   toileting scheduled   safety round/check completed   room organization consistent   nonskid shoes/slippers when out of bed  Taken 11/7/2020 1200 by Haley Shaw, RN  Safety Promotion/Fall Prevention:   activity supervised   assistive device/personal items within reach   clutter free environment maintained   fall prevention program maintained   gait belt   toileting scheduled   safety round/check completed   room organization consistent   nonskid shoes/slippers when out of bed  Taken 11/7/2020 1000 by Haley Shaw, RN  Safety Promotion/Fall Prevention:   activity supervised   assistive device/personal items within reach   clutter free environment maintained   fall prevention program maintained   gait belt   toileting scheduled   safety round/check completed   room organization consistent   nonskid shoes/slippers when out of bed  Taken 11/7/2020 0830 by Haley Shaw, RN  Safety Promotion/Fall Prevention:   activity supervised   assistive device/personal items within reach   clutter free environment maintained   fall prevention program maintained   gait belt   toileting scheduled   safety round/check completed   room organization consistent   nonskid shoes/slippers when out of bed  Intervention: Prevent Skin Injury  Recent Flowsheet Documentation  Taken 11/7/2020 1330 by Haley Shaw, RN  Body Position: (up in chair) other (see  comments)  Taken 11/7/2020 1200 by Haley Shaw RN  Body Position: (up in chair) other (see comments)  Taken 11/7/2020 1000 by Haley Shaw RN  Body Position: (up in chair) other (see comments)  Taken 11/7/2020 0830 by Haley Shaw RN  Body Position: (up in chair) other (see comments)  Intervention: Prevent and Manage VTE (venous thromboembolism) Risk  Recent Flowsheet Documentation  Taken 11/7/2020 1330 by Haley Shaw RN  VTE Prevention/Management:   bilateral   sequential compression devices off  Taken 11/7/2020 1200 by Haley Shaw RN  VTE Prevention/Management:   bilateral   sequential compression devices off  Taken 11/7/2020 1000 by Haley Shaw RN  VTE Prevention/Management:   bilateral   sequential compression devices off  Taken 11/7/2020 0830 by Haley Shaw RN  VTE Prevention/Management:   bilateral   sequential compression devices off  Intervention: Prevent Infection  Recent Flowsheet Documentation  Taken 11/7/2020 1330 by Haley Shaw RN  Infection Prevention: environmental surveillance performed  Taken 11/7/2020 1200 by Haley Shaw RN  Infection Prevention: environmental surveillance performed  Taken 11/7/2020 1000 by Haley Shaw RN  Infection Prevention: environmental surveillance performed  Taken 11/7/2020 0830 by Haley Shaw RN  Infection Prevention: environmental surveillance performed  Goal: Optimal Comfort and Wellbeing  Outcome: Met  Intervention: Provide Person-Centered Care  Recent Flowsheet Documentation  Taken 11/7/2020 1330 by Haley Shaw RN  Trust Relationship/Rapport:   care explained   choices provided   questions encouraged   questions answered  Taken 11/7/2020 0830 by Haley Shaw RN  Trust Relationship/Rapport: care explained  Goal: Readiness for Transition of Care  Outcome: Met     Problem: Fall Injury Risk  Goal: Absence of Fall and Fall-Related Injury  Outcome: Met  Intervention: Identify and Manage Contributors to Fall Injury  Risk  Recent Flowsheet Documentation  Taken 11/7/2020 0830 by Haley Shaw, RN  Medication Review/Management: medications reviewed  Intervention: Promote Injury-Free Environment  Recent Flowsheet Documentation  Taken 11/7/2020 1330 by Haley Shaw, RN  Safety Promotion/Fall Prevention:   activity supervised   assistive device/personal items within reach   clutter free environment maintained   fall prevention program maintained   gait belt   toileting scheduled   safety round/check completed   room organization consistent   nonskid shoes/slippers when out of bed  Taken 11/7/2020 1200 by Haley Shaw, RN  Safety Promotion/Fall Prevention:   activity supervised   assistive device/personal items within reach   clutter free environment maintained   fall prevention program maintained   gait belt   toileting scheduled   safety round/check completed   room organization consistent   nonskid shoes/slippers when out of bed  Taken 11/7/2020 1000 by Haley Shaw, RN  Safety Promotion/Fall Prevention:   activity supervised   assistive device/personal items within reach   clutter free environment maintained   fall prevention program maintained   gait belt   toileting scheduled   safety round/check completed   room organization consistent   nonskid shoes/slippers when out of bed  Taken 11/7/2020 0830 by Haley Shaw, RN  Safety Promotion/Fall Prevention:   activity supervised   assistive device/personal items within reach   clutter free environment maintained   fall prevention program maintained   gait belt   toileting scheduled   safety round/check completed   room organization consistent   nonskid shoes/slippers when out of bed     Problem: Bleeding (Spinal Surgery)  Goal: Absence of Bleeding  Outcome: Met     Problem: Bowel Elimination Impaired (Spinal Surgery)  Goal: Effective Bowel Elimination  Outcome: Met  Intervention: Promote Effective Bowel Elimination  Recent Flowsheet Documentation  Taken 11/7/2020 0830  by Haley Shaw RN  Bowel Elimination Promotion: adequate fluid intake promoted     Problem: Functional Ability Impaired (Spinal Surgery)  Goal: Optimal Functional Ability  Outcome: Met  Intervention: Optimize Functional Status  Recent Flowsheet Documentation  Taken 11/7/2020 1330 by Haley Shaw RN  Positioning/Transfer Devices:   pillows   in use  Taken 11/7/2020 1200 by Haley Shaw RN  Positioning/Transfer Devices:   pillows   in use  Taken 11/7/2020 1000 by Haley Shaw RN  Positioning/Transfer Devices:   pillows   in use  Taken 11/7/2020 0830 by Haley Shaw RN  Positioning/Transfer Devices:   pillows   in use     Problem: Infection (Spinal Surgery)  Goal: Absence of Infection Signs and Symptoms  Outcome: Met     Problem: Neurologic Impairment (Spinal Surgery)  Goal: Optimal Neurologic Function  Outcome: Met  Intervention: Optimize Neurologic Function  Recent Flowsheet Documentation  Taken 11/7/2020 1330 by Haley Shaw RN  Body Position: (up in chair) other (see comments)  Taken 11/7/2020 1200 by Haley Shaw RN  Body Position: (up in chair) other (see comments)  Taken 11/7/2020 1000 by Haley Shaw RN  Body Position: (up in chair) other (see comments)  Taken 11/7/2020 0830 by Haley Shaw RN  Body Position: (up in chair) other (see comments)     Problem: Ongoing Anesthesia Effects (Spinal Surgery)  Goal: Anesthesia/Sedation Recovery  Outcome: Met  Intervention: Optimize Anesthesia Recovery  Recent Flowsheet Documentation  Taken 11/7/2020 1330 by Haley Shaw, RN  Safety Promotion/Fall Prevention:   activity supervised   assistive device/personal items within reach   clutter free environment maintained   fall prevention program maintained   gait belt   toileting scheduled   safety round/check completed   room organization consistent   nonskid shoes/slippers when out of bed  Taken 11/7/2020 1200 by Haley Shaw, RN  Safety Promotion/Fall Prevention:   activity  supervised   assistive device/personal items within reach   clutter free environment maintained   fall prevention program maintained   gait belt   toileting scheduled   safety round/check completed   room organization consistent   nonskid shoes/slippers when out of bed  Taken 11/7/2020 1000 by Haley Shaw, RN  Safety Promotion/Fall Prevention:   activity supervised   assistive device/personal items within reach   clutter free environment maintained   fall prevention program maintained   gait belt   toileting scheduled   safety round/check completed   room organization consistent   nonskid shoes/slippers when out of bed  Taken 11/7/2020 0830 by Haley Shaw, RN  Safety Promotion/Fall Prevention:   activity supervised   assistive device/personal items within reach   clutter free environment maintained   fall prevention program maintained   gait belt   toileting scheduled   safety round/check completed   room organization consistent   nonskid shoes/slippers when out of bed     Problem: Pain (Spinal Surgery)  Goal: Acceptable Pain Control  Outcome: Met     Problem: Postoperative Nausea and Vomiting (Spinal Surgery)  Goal: Nausea and Vomiting Relief  Outcome: Met     Problem: Postoperative Urinary Retention (Spinal Surgery)  Goal: Effective Urinary Elimination  Outcome: Met  Intervention: Monitor and Manage Urinary Retention  Recent Flowsheet Documentation  Taken 11/7/2020 1330 by Haley Shaw, RN  Urinary Elimination Promotion:   toileting offered   toileting scheduled   toileting device within reach  Taken 11/7/2020 1200 by Haley Shaw, RN  Urinary Elimination Promotion:   toileting offered   toileting scheduled   toileting device within reach  Taken 11/7/2020 1000 by Haley Shaw, RN  Urinary Elimination Promotion:   toileting offered   toileting scheduled   toileting device within reach  Taken 11/7/2020 0830 by Haley Shaw, RN  Urinary Elimination Promotion:   toileting offered   toileting  scheduled   toileting device within reach

## 2020-11-07 NOTE — PLAN OF CARE
PT is A&Ox4, mood/affect appropriate. Speech clear/logical. Lung sound clear bilaterally. Gauze/medipore dressings to the lower back CDI. Abd grossly distended/rounded with hypoactive bowel sound. No overt deficit  noted in gross motor movement.  Or PT denies pain/discomfort/ or N/V at this time. VSS  Will cont to mx. Call light in reach. Spouse at bedtime.

## (undated) DEVICE — ANTIBACTERIAL UNDYED BRAIDED (POLYGLACTIN 910), SYNTHETIC ABSORBABLE SUTURE: Brand: COATED VICRYL

## (undated) DEVICE — 3M™ MEDIPORE™ H SOFT CLOTH SURGICAL TAPE, 2863, 3 IN X 10 YD, 12/CASE: Brand: 3M™ MEDIPORE™

## (undated) DEVICE — NDL HYPO ECLPS SFTY 22G 1 1/2IN

## (undated) DEVICE — DRAPE,REIN 53X77,STERILE: Brand: MEDLINE

## (undated) DEVICE — GOWN,REINF,POLY,ECL,PP SLV,3XL,XLONG: Brand: MEDLINE

## (undated) DEVICE — SYR SLP TP 10ML DISP

## (undated) DEVICE — 3.0MM PRECISION NEURO (MATCH HEAD)

## (undated) DEVICE — DRP C/ARMOR

## (undated) DEVICE — CANNULA,OXY,ADULT,SUPERSOFT,W/7'TUB,UC: Brand: MEDLINE

## (undated) DEVICE — 3M™ STERI-STRIP™ REINFORCED ADHESIVE SKIN CLOSURES, R1547, 1/2 IN X 4 IN (12 MM X 100 MM), 6 STRIPS/ENVELOPE: Brand: 3M™ STERI-STRIP™

## (undated) DEVICE — INTENDED USE FOR SURGICAL MARKING ON INTACT SKIN, ALSO PROVIDES A PERMANENT METHOD OF IDENTIFYING OBJECTS IN THE OPERATING ROOM: Brand: WRITESITE® REGULAR TIP SKIN MARKER

## (undated) DEVICE — MEDI-VAC NON-CONDUCTIVE SUCTION TUBING: Brand: CARDINAL HEALTH

## (undated) DEVICE — PAD ARMBRD SURG CONVOL 7.5X20X2IN

## (undated) DEVICE — DIFFUSER: Brand: CORE, MAESTRO

## (undated) DEVICE — PROXIMATE RH ROTATING HEAD SKIN STAPLERS (35 WIDE) CONTAINS 35 STAINLESS STEEL STAPLES: Brand: PROXIMATE

## (undated) DEVICE — SPETZLER/CLAW TIP, UNIVERSAL

## (undated) DEVICE — DRSNG TELFA PAD NONADH STR 1S 3X8IN

## (undated) DEVICE — SPNG GZ WOVN 4X4IN 12PLY 10/BX STRL

## (undated) DEVICE — HDRST INTUB GENTLETOUCH SLOT 7IN RT

## (undated) DEVICE — IRRISEPT WOUND DEBRIDMENT AND CLEANSING SYSTEM: Brand: IRRISEPT

## (undated) DEVICE — POSTN ARM CRDL LAMIN

## (undated) DEVICE — KITTNER SPONGE: Brand: DEROYAL

## (undated) DEVICE — MEDI-VAC YANKAUER SUCTION HANDLE: Brand: CARDINAL HEALTH

## (undated) DEVICE — PK ATS CUST W CARDIOTOMY RESEVOIR

## (undated) DEVICE — OIL CARTRIDGE: Brand: CORE, MAESTRO

## (undated) DEVICE — ADAPT ST INFUS ADMIN SYR 70IN

## (undated) DEVICE — PK SPINE ORTHO 10

## (undated) DEVICE — TB SXN FRAZIER 8F STRL

## (undated) DEVICE — AMD ANTIMICROBIAL GAUZE SPONGES,12 PLY USP TYPE VII, 0.2% POLYHEXAMETHYLENE BIGUANIDE HCI (PHMB): Brand: CURITY

## (undated) DEVICE — SYR LL 10ML LF

## (undated) DEVICE — DISPOSABLE TUBING SET AND EXTENDER FILTER TUBING

## (undated) DEVICE — GLV SURG SENSICARE MICRO PF LF 6.5 STRL

## (undated) DEVICE — SPNG GZ STRL 2S 4X4 12PLY

## (undated) DEVICE — CVR HNDL LIGHT RIGID

## (undated) DEVICE — GLV SURG SENSICARE MICRO PF LF 9 STRL

## (undated) DEVICE — ADHS LIQ MASTISOL 2/3ML

## (undated) DEVICE — BANDAGE,GAUZE,BULKEE II,4.5"X4.1YD,STRL: Brand: MEDLINE

## (undated) DEVICE — GLV SURG SENSICARE W/ALOE PF LF 9 STRL

## (undated) DEVICE — KT DRN EVAC WND PVC PCH WTROC RND 10F400

## (undated) DEVICE — APPL DURAPREP IODOPHOR APL 26ML

## (undated) DEVICE — AIRWY SZ11

## (undated) DEVICE — 2963 MEDIPORE SOFT CLOTH TAPE 3 IN X 10 YD 12 RLS/CS: Brand: 3M™ MEDIPORE™

## (undated) DEVICE — 450 ML BOTTLE OF 0.05% CHLORHEXIDINE GLUCONATE IN 99.95% STERILE WATER FOR IRRIGATION, USP AND APPLICATOR.: Brand: IRRISEPT ANTIMICROBIAL WOUND LAVAGE

## (undated) DEVICE — THE MILL DISPOSABLE - FINE

## (undated) DEVICE — GAUZE,SPONGE,4"X4",16PLY,XRAY,STRL,LF: Brand: MEDLINE

## (undated) DEVICE — SUCTION CANISTER, 2500CC, RIGID: Brand: DEROYAL

## (undated) DEVICE — MEDI-VAC YANKAUER SUCTION HANDLE W/BULBOUS TIP: Brand: CARDINAL HEALTH

## (undated) DEVICE — ADAPT LUER STUB INTRAMEDIC PE/200 17G

## (undated) DEVICE — SNAP KOVER: Brand: UNBRANDED

## (undated) DEVICE — SOL LR 1000ML